# Patient Record
Sex: FEMALE | Race: WHITE | NOT HISPANIC OR LATINO | Employment: OTHER | URBAN - METROPOLITAN AREA
[De-identification: names, ages, dates, MRNs, and addresses within clinical notes are randomized per-mention and may not be internally consistent; named-entity substitution may affect disease eponyms.]

---

## 2023-01-31 PROBLEM — I20.89 CHRONIC STABLE ANGINA (CMS-HCC): Status: ACTIVE | Noted: 2023-01-31

## 2023-01-31 PROBLEM — K80.20 SYMPTOMATIC CHOLELITHIASIS: Status: ACTIVE | Noted: 2023-01-31

## 2023-01-31 PROBLEM — E04.9 GOITER: Status: ACTIVE | Noted: 2023-01-31

## 2023-01-31 PROBLEM — E78.5 HYPERLIPIDEMIA, UNSPECIFIED: Status: ACTIVE | Noted: 2023-01-31

## 2023-01-31 PROBLEM — R20.2 PARESTHESIA: Status: ACTIVE | Noted: 2023-01-31

## 2023-01-31 PROBLEM — J84.9 INTERSTITIAL LUNG DISORDERS (MULTI): Status: ACTIVE | Noted: 2023-01-31

## 2023-01-31 PROBLEM — R14.0 ABDOMINAL BLOATING: Status: ACTIVE | Noted: 2023-01-31

## 2023-01-31 PROBLEM — R27.0 ATAXIA: Status: ACTIVE | Noted: 2023-01-31

## 2023-01-31 PROBLEM — R10.9 ABDOMINAL PAIN: Status: ACTIVE | Noted: 2023-01-31

## 2023-01-31 PROBLEM — S01.01XA SCALP LACERATION: Status: ACTIVE | Noted: 2023-01-31

## 2023-01-31 PROBLEM — I25.10 CAD (CORONARY ARTERY DISEASE): Status: ACTIVE | Noted: 2023-01-31

## 2023-01-31 PROBLEM — R10.11 BILATERAL UPPER ABDOMINAL PAIN: Status: ACTIVE | Noted: 2023-01-31

## 2023-01-31 PROBLEM — R10.12 BILATERAL UPPER ABDOMINAL PAIN: Status: ACTIVE | Noted: 2023-01-31

## 2023-01-31 PROBLEM — E04.1 THYROID NODULE: Status: ACTIVE | Noted: 2023-01-31

## 2023-01-31 PROBLEM — K80.20 CHOLELITHIASIS: Status: ACTIVE | Noted: 2023-01-31

## 2023-01-31 PROBLEM — Z95.5 PRESENCE OF STENT IN CORONARY ARTERY: Status: ACTIVE | Noted: 2023-01-31

## 2023-01-31 PROBLEM — E55.9 VITAMIN D DEFICIENCY: Status: ACTIVE | Noted: 2023-01-31

## 2023-01-31 PROBLEM — R26.81 GAIT INSTABILITY: Status: ACTIVE | Noted: 2023-01-31

## 2023-01-31 PROBLEM — G45.9 TRANSIENT ISCHEMIC ATTACK: Status: ACTIVE | Noted: 2023-01-31

## 2023-01-31 RX ORDER — ACETAMINOPHEN 500 MG
1 TABLET ORAL DAILY
COMMUNITY
End: 2023-11-13 | Stop reason: WASHOUT

## 2023-01-31 RX ORDER — SIMVASTATIN 10 MG/1
1 TABLET, FILM COATED ORAL DAILY
COMMUNITY
Start: 2019-03-25 | End: 2023-09-12 | Stop reason: SDUPTHER

## 2023-01-31 RX ORDER — ASPIRIN 81 MG/1
1 TABLET ORAL DAILY
COMMUNITY
End: 2024-03-13

## 2023-01-31 RX ORDER — MULTIVITAMIN
1 TABLET ORAL DAILY
COMMUNITY
End: 2023-11-13 | Stop reason: WASHOUT

## 2023-01-31 RX ORDER — GINSENG 100 MG
CAPSULE ORAL
COMMUNITY
End: 2023-11-13 | Stop reason: WASHOUT

## 2023-01-31 RX ORDER — AMLODIPINE BESYLATE 5 MG/1
1 TABLET ORAL DAILY
COMMUNITY
Start: 2018-04-22 | End: 2024-04-19 | Stop reason: SDUPTHER

## 2023-01-31 RX ORDER — METOPROLOL TARTRATE 25 MG/1
1 TABLET, FILM COATED ORAL
COMMUNITY
Start: 2018-04-16 | End: 2024-04-19 | Stop reason: SDUPTHER

## 2023-03-14 ENCOUNTER — APPOINTMENT (OUTPATIENT)
Dept: PRIMARY CARE | Facility: CLINIC | Age: 88
End: 2023-03-14
Payer: MEDICARE

## 2023-04-21 PROBLEM — R39.11 URINARY HESITANCY: Status: ACTIVE | Noted: 2023-04-21

## 2023-04-21 PROBLEM — J31.0 ATROPHIC RHINITIS: Status: ACTIVE | Noted: 2023-04-21

## 2023-04-21 PROBLEM — H53.9 VISION CHANGES: Status: ACTIVE | Noted: 2023-04-21
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
## 2023-04-21 PROBLEM — E78.9 LIPID DISORDER: Status: ACTIVE | Noted: 2023-04-21

## 2023-04-21 PROBLEM — R04.0 EPISTAXIS: Status: ACTIVE | Noted: 2023-04-21

## 2023-04-21 PROBLEM — E78.5 HYPERLIPIDEMIA: Status: ACTIVE | Noted: 2023-04-21

## 2023-04-21 PROBLEM — R07.89 CHEST PAIN, MIDSTERNAL: Status: ACTIVE | Noted: 2023-04-21

## 2023-04-21 RX ORDER — NAPROXEN SODIUM 220 MG/1
1 TABLET, FILM COATED ORAL DAILY
COMMUNITY
End: 2023-04-24

## 2023-04-24 ENCOUNTER — OFFICE VISIT (OUTPATIENT)
Dept: PRIMARY CARE | Facility: CLINIC | Age: 88
End: 2023-04-24
Payer: MEDICARE

## 2023-04-24 VITALS
TEMPERATURE: 96.8 F | SYSTOLIC BLOOD PRESSURE: 118 MMHG | OXYGEN SATURATION: 97 % | WEIGHT: 112 LBS | BODY MASS INDEX: 22.62 KG/M2 | RESPIRATION RATE: 18 BRPM | DIASTOLIC BLOOD PRESSURE: 70 MMHG | HEART RATE: 70 BPM

## 2023-04-24 DIAGNOSIS — E78.2 MIXED HYPERLIPIDEMIA: ICD-10-CM

## 2023-04-24 DIAGNOSIS — I10 PRIMARY HYPERTENSION: ICD-10-CM

## 2023-04-24 DIAGNOSIS — I25.10 CORONARY ARTERY DISEASE WITHOUT ANGINA PECTORIS, UNSPECIFIED VESSEL OR LESION TYPE, UNSPECIFIED WHETHER NATIVE OR TRANSPLANTED HEART: Primary | ICD-10-CM

## 2023-04-24 DIAGNOSIS — R27.0 ATAXIA: ICD-10-CM

## 2023-04-24 PROCEDURE — 1036F TOBACCO NON-USER: CPT | Performed by: INTERNAL MEDICINE

## 2023-04-24 PROCEDURE — 3074F SYST BP LT 130 MM HG: CPT | Performed by: INTERNAL MEDICINE

## 2023-04-24 PROCEDURE — 99213 OFFICE O/P EST LOW 20 MIN: CPT | Performed by: INTERNAL MEDICINE

## 2023-04-24 PROCEDURE — 1157F ADVNC CARE PLAN IN RCRD: CPT | Performed by: INTERNAL MEDICINE

## 2023-04-24 PROCEDURE — 3078F DIAST BP <80 MM HG: CPT | Performed by: INTERNAL MEDICINE

## 2023-04-24 PROCEDURE — 1159F MED LIST DOCD IN RCRD: CPT | Performed by: INTERNAL MEDICINE

## 2023-04-24 ASSESSMENT — PATIENT HEALTH QUESTIONNAIRE - PHQ9
SUM OF ALL RESPONSES TO PHQ9 QUESTIONS 1 AND 2: 0
2. FEELING DOWN, DEPRESSED OR HOPELESS: NOT AT ALL
1. LITTLE INTEREST OR PLEASURE IN DOING THINGS: NOT AT ALL

## 2023-04-24 ASSESSMENT — PAIN SCALES - GENERAL: PAINLEVEL: 0-NO PAIN
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
## 2023-04-24 NOTE — ASSESSMENT & PLAN NOTE
Excellent control no dizziness or lightheadedness no change for now continue to follow renal panel

## 2023-07-28 ENCOUNTER — OFFICE VISIT (OUTPATIENT)
Dept: PRIMARY CARE | Facility: CLINIC | Age: 88
End: 2023-07-28
Payer: MEDICARE

## 2023-07-28 VITALS
HEART RATE: 76 BPM | OXYGEN SATURATION: 96 % | SYSTOLIC BLOOD PRESSURE: 134 MMHG | DIASTOLIC BLOOD PRESSURE: 70 MMHG | HEIGHT: 59 IN | TEMPERATURE: 97.7 F | WEIGHT: 112 LBS | BODY MASS INDEX: 22.58 KG/M2

## 2023-07-28 DIAGNOSIS — R53.83 OTHER FATIGUE: ICD-10-CM

## 2023-07-28 DIAGNOSIS — I10 PRIMARY HYPERTENSION: ICD-10-CM

## 2023-07-28 DIAGNOSIS — I25.10 CORONARY ARTERY DISEASE WITHOUT ANGINA PECTORIS, UNSPECIFIED VESSEL OR LESION TYPE, UNSPECIFIED WHETHER NATIVE OR TRANSPLANTED HEART: Primary | ICD-10-CM

## 2023-07-28 DIAGNOSIS — R73.9 HYPERGLYCEMIA: ICD-10-CM

## 2023-07-28 DIAGNOSIS — E55.9 VITAMIN D DEFICIENCY: ICD-10-CM

## 2023-07-28 LAB
ALANINE AMINOTRANSFERASE (SGPT) (U/L) IN SER/PLAS: 11 U/L (ref 7–45)
ALBUMIN (G/DL) IN SER/PLAS: 4.1 G/DL (ref 3.4–5)
ALKALINE PHOSPHATASE (U/L) IN SER/PLAS: 74 U/L (ref 33–136)
ANION GAP IN SER/PLAS: 12 MMOL/L (ref 10–20)
ASPARTATE AMINOTRANSFERASE (SGOT) (U/L) IN SER/PLAS: 19 U/L (ref 9–39)
BILIRUBIN TOTAL (MG/DL) IN SER/PLAS: 0.7 MG/DL (ref 0–1.2)
CALCIDIOL (25 OH VITAMIN D3) (NG/ML) IN SER/PLAS: 67 NG/ML
CALCIUM (MG/DL) IN SER/PLAS: 9.4 MG/DL (ref 8.6–10.6)
CARBON DIOXIDE, TOTAL (MMOL/L) IN SER/PLAS: 28 MMOL/L (ref 21–32)
CHLORIDE (MMOL/L) IN SER/PLAS: 103 MMOL/L (ref 98–107)
CREATININE (MG/DL) IN SER/PLAS: 0.83 MG/DL (ref 0.5–1.05)
ERYTHROCYTE DISTRIBUTION WIDTH (RATIO) BY AUTOMATED COUNT: 15.6 % (ref 11.5–14.5)
ERYTHROCYTE MEAN CORPUSCULAR HEMOGLOBIN CONCENTRATION (G/DL) BY AUTOMATED: 31.7 G/DL (ref 32–36)
ERYTHROCYTE MEAN CORPUSCULAR VOLUME (FL) BY AUTOMATED COUNT: 97 FL (ref 80–100)
ERYTHROCYTES (10*6/UL) IN BLOOD BY AUTOMATED COUNT: 4.25 X10E12/L (ref 4–5.2)
GFR FEMALE: 65 ML/MIN/1.73M2
GLUCOSE (MG/DL) IN SER/PLAS: 90 MG/DL (ref 74–99)
HEMATOCRIT (%) IN BLOOD BY AUTOMATED COUNT: 41.3 % (ref 36–46)
HEMOGLOBIN (G/DL) IN BLOOD: 13.1 G/DL (ref 12–16)
LEUKOCYTES (10*3/UL) IN BLOOD BY AUTOMATED COUNT: 5.6 X10E9/L (ref 4.4–11.3)
NRBC (PER 100 WBCS) BY AUTOMATED COUNT: 0 /100 WBC (ref 0–0)
PLATELETS (10*3/UL) IN BLOOD AUTOMATED COUNT: 168 X10E9/L (ref 150–450)
POTASSIUM (MMOL/L) IN SER/PLAS: 4.6 MMOL/L (ref 3.5–5.3)
PROTEIN TOTAL: 7.3 G/DL (ref 6.4–8.2)
SODIUM (MMOL/L) IN SER/PLAS: 138 MMOL/L (ref 136–145)
THYROTROPIN (MIU/L) IN SER/PLAS BY DETECTION LIMIT <= 0.05 MIU/L: 3.2 MIU/L (ref 0.44–3.98)
UREA NITROGEN (MG/DL) IN SER/PLAS: 16 MG/DL (ref 6–23)

## 2023-07-28 PROCEDURE — 1036F TOBACCO NON-USER: CPT | Performed by: FAMILY MEDICINE

## 2023-07-28 PROCEDURE — 82306 VITAMIN D 25 HYDROXY: CPT

## 2023-07-28 PROCEDURE — 83036 HEMOGLOBIN GLYCOSYLATED A1C: CPT

## 2023-07-28 PROCEDURE — 99214 OFFICE O/P EST MOD 30 MIN: CPT | Performed by: FAMILY MEDICINE

## 2023-07-28 PROCEDURE — 3078F DIAST BP <80 MM HG: CPT | Performed by: FAMILY MEDICINE

## 2023-07-28 PROCEDURE — 1157F ADVNC CARE PLAN IN RCRD: CPT | Performed by: FAMILY MEDICINE

## 2023-07-28 PROCEDURE — 80053 COMPREHEN METABOLIC PANEL: CPT

## 2023-07-28 PROCEDURE — 3075F SYST BP GE 130 - 139MM HG: CPT | Performed by: FAMILY MEDICINE

## 2023-07-28 PROCEDURE — 85027 COMPLETE CBC AUTOMATED: CPT

## 2023-07-28 PROCEDURE — 1126F AMNT PAIN NOTED NONE PRSNT: CPT | Performed by: FAMILY MEDICINE

## 2023-07-28 PROCEDURE — 1159F MED LIST DOCD IN RCRD: CPT | Performed by: FAMILY MEDICINE

## 2023-07-28 PROCEDURE — 84443 ASSAY THYROID STIM HORMONE: CPT

## 2023-07-28 ASSESSMENT — PAIN SCALES - GENERAL: PAINLEVEL: 0-NO PAIN

## 2023-07-28 NOTE — PROGRESS NOTES
"Subjective   Patient ID: Lashonda Bran is a 89 y.o. female who presents for New Patient Visit (Establish care with pcp, pt is not fasting. ).    HPI patient here to establish    . Lives alone. 2 children. Grand and great kids.    Keeps active, admits to be slowing. Driving.  Just saw ophthalmology. All stable.  Tires easily. Takes Vit. D.  Still feels she needs to sleep a lot.  She does take naps.  Review of Systems    Objective   /70 (BP Location: Left arm, Patient Position: Sitting, BP Cuff Size: Adult)   Pulse 76   Temp 36.5 °C (97.7 °F) (Temporal)   Ht 1.499 m (4' 11\")   Wt 50.8 kg (112 lb)   SpO2 96%   BMI 22.62 kg/m²     Physical Exam  Alert, pleasant and in no acute distress.  Heart: Regular rate and rhythm without murmur  Lungs: Clear to auscultation  Lower extremities: No edema  Assessment/Plan   Problem List Items Addressed This Visit          Cardiac and Vasculature    CAD (coronary artery disease) - Primary    Relevant Orders    CBC    TSH with reflex to Free T4 if abnormal    Vitamin D 25-Hydroxy,Total    Comprehensive Metabolic Panel    Hemoglobin A1C    Primary hypertension    Relevant Orders    CBC    TSH with reflex to Free T4 if abnormal    Vitamin D 25-Hydroxy,Total    Comprehensive Metabolic Panel    Hemoglobin A1C       Endocrine/Metabolic    Vitamin D deficiency    Relevant Orders    CBC    TSH with reflex to Free T4 if abnormal    Vitamin D 25-Hydroxy,Total    Comprehensive Metabolic Panel    Hemoglobin A1C     Other Visit Diagnoses       Other fatigue        Relevant Orders    CBC    TSH with reflex to Free T4 if abnormal    Vitamin D 25-Hydroxy,Total    Comprehensive Metabolic Panel    Hemoglobin A1C    Hyperglycemia        Relevant Orders    Hemoglobin A1C        Patient here to establish.  She is a very healthy 89-year-old.  She has multiple medical conditions including coronary disease, hypertension, vitamin D deficiency and hyperglycemia.  With complaint of " progressive fatigue, we will check a CBC, CMP, TSH and a vitamin D.  We will also recheck an A1c has her last 1 was elevated at 6.3 although all other sugar readings look good.  We will contact patient in 3 to 4 days.  She is to call or follow-up if there is any progressive worsening or new symptoms.  Otherwise, we will plan follow-up in 6 months

## 2023-07-29 LAB
ESTIMATED AVERAGE GLUCOSE FOR HBA1C: 120 MG/DL
HEMOGLOBIN A1C/HEMOGLOBIN TOTAL IN BLOOD: 5.8 %

## 2023-09-12 DIAGNOSIS — E78.5 DYSLIPIDEMIA: ICD-10-CM

## 2023-09-13 DIAGNOSIS — E78.2 MIXED HYPERLIPIDEMIA: Primary | ICD-10-CM

## 2023-09-13 RX ORDER — SIMVASTATIN 10 MG/1
10 TABLET, FILM COATED ORAL NIGHTLY
Qty: 90 TABLET | Refills: 3 | Status: SHIPPED
Start: 2023-09-13 | End: 2023-11-13 | Stop reason: WASHOUT

## 2023-09-13 RX ORDER — SIMVASTATIN 10 MG/1
10 TABLET, FILM COATED ORAL NIGHTLY
Qty: 90 TABLET | Refills: 3 | Status: SHIPPED | OUTPATIENT
Start: 2023-09-13

## 2023-10-24 ENCOUNTER — APPOINTMENT (OUTPATIENT)
Dept: PRIMARY CARE | Facility: CLINIC | Age: 88
End: 2023-10-24
Payer: MEDICARE

## 2023-11-13 PROBLEM — E78.5 HYPERLIPIDEMIA, UNSPECIFIED: Status: RESOLVED | Noted: 2023-01-31 | Resolved: 2023-11-13

## 2023-11-13 PROBLEM — E78.9 LIPID DISORDER: Status: RESOLVED | Noted: 2023-04-21 | Resolved: 2023-11-13

## 2023-11-13 PROBLEM — I25.10 ATHEROSCLEROTIC HEART DISEASE OF NATIVE CORONARY ARTERY WITHOUT ANGINA PECTORIS: Status: RESOLVED | Noted: 2017-05-10 | Resolved: 2023-11-13

## 2023-11-13 PROBLEM — I25.10 CORONARY ARTERIOSCLEROSIS: Status: RESOLVED | Noted: 2017-08-16 | Resolved: 2023-11-13

## 2023-11-13 PROBLEM — I10 PRIMARY HYPERTENSION: Status: RESOLVED | Noted: 2023-04-24 | Resolved: 2023-11-13

## 2023-11-15 ENCOUNTER — OFFICE VISIT (OUTPATIENT)
Dept: CARDIOLOGY | Facility: CLINIC | Age: 88
End: 2023-11-15
Payer: MEDICARE

## 2023-11-15 VITALS
DIASTOLIC BLOOD PRESSURE: 76 MMHG | WEIGHT: 111 LBS | HEIGHT: 59 IN | SYSTOLIC BLOOD PRESSURE: 132 MMHG | HEART RATE: 80 BPM | OXYGEN SATURATION: 96 % | BODY MASS INDEX: 22.38 KG/M2

## 2023-11-15 DIAGNOSIS — I10 ESSENTIAL HYPERTENSION: ICD-10-CM

## 2023-11-15 DIAGNOSIS — R07.89 CHEST PAIN, MIDSTERNAL: ICD-10-CM

## 2023-11-15 DIAGNOSIS — I20.89 CHRONIC STABLE ANGINA (CMS-HCC): ICD-10-CM

## 2023-11-15 DIAGNOSIS — Z95.5 PRESENCE OF STENT IN CORONARY ARTERY: ICD-10-CM

## 2023-11-15 DIAGNOSIS — E78.2 MIXED HYPERLIPIDEMIA: ICD-10-CM

## 2023-11-15 DIAGNOSIS — I25.10 CORONARY ARTERY DISEASE WITHOUT ANGINA PECTORIS, UNSPECIFIED VESSEL OR LESION TYPE, UNSPECIFIED WHETHER NATIVE OR TRANSPLANTED HEART: Primary | ICD-10-CM

## 2023-11-15 PROCEDURE — 1036F TOBACCO NON-USER: CPT | Performed by: INTERNAL MEDICINE

## 2023-11-15 PROCEDURE — 3075F SYST BP GE 130 - 139MM HG: CPT | Performed by: INTERNAL MEDICINE

## 2023-11-15 PROCEDURE — 3078F DIAST BP <80 MM HG: CPT | Performed by: INTERNAL MEDICINE

## 2023-11-15 PROCEDURE — 1126F AMNT PAIN NOTED NONE PRSNT: CPT | Performed by: INTERNAL MEDICINE

## 2023-11-15 PROCEDURE — 99213 OFFICE O/P EST LOW 20 MIN: CPT | Performed by: INTERNAL MEDICINE

## 2023-11-15 PROCEDURE — 1159F MED LIST DOCD IN RCRD: CPT | Performed by: INTERNAL MEDICINE

## 2023-11-15 ASSESSMENT — ENCOUNTER SYMPTOMS: DYSPNEA ON EXERTION: 1

## 2023-11-15 ASSESSMENT — PAIN SCALES - GENERAL: PAINLEVEL: 0-NO PAIN

## 2023-11-15 NOTE — PROGRESS NOTES
Subjective   Lashonda Bran is a 89 y.o. female.    Chief Complaint:  Follow-up coronary artery disease.    HPI    Since her last visit she has had no further episodes of angina.  She remains very independent.  She lives on her own.  She does not get any pressure or heaviness in the chest.  She has been having some anginal symptoms.  On her last visit we performed a Lexiscan thallium study which showed normal left ventricular function and no ischemia.    She presented in 2017 with an acute myocardial infarction. She presented in 2017 with an acute myocardial infarction. At that time she had discomfort which began in the lower chest area and spread up through the chest and down into the arms with shortness of breath. She presented to the hospital where she was taken urgently to the cardiac catheterization laboratory for intervention. At that time she had a stent placed.     She denies a history of hypertension. Has a history of hyperlipidemia. No family history of premature coronary artery disease. Is on statin therapy. She did have a transient ischemic attack in 2016. At that time her work-up was negative.     Past medical history: Significant for interstitial lung disease. She has had a history of cholecystitis treated medically.     Past surgical history significant for appendectomy, hysterectomy, carpal tunnel, lumbar spine surgery, and some minor surgical procedures.     The patient is . Has two children and grandchildren and great-grandchildren in the area.      Allergies  Medication    · Aspirin TABS   Recorded By: Liliana Leal; 7/1/2016 10:52:49 AM   · codeine   Recorded By: Clifford Mccall; 5/29/2018 12:51:26 PM   · Darvocet A500   Recorded By: Liliana Leal; 7/1/2016 10:52:50 AM   · Penicillins   Recorded By: Liliana Leal; 7/1/2016 10:52:49 AM   · Percocet TABS   Recorded By: Liliana Leal; 7/1/2016 10:52:49 AM   · sulfa   Recorded By: Liliana Leal; 7/1/2016 10:52:49 AM   ·  "Vicodin TABS   Recorded By: Liliana Leal; 7/1/2016 10:52:50 AM     Family History  Mother    · Family history of cerebrovascular accident (CVA) (V17.1) (Z82.3)  Father    · Family history of diabetes mellitus (V18.0) (Z83.3)   · Family history of Heart problem  Brother    · Family history of malignant neoplasm (V16.9) (Z80.9)     Social History  Problems    · Lives alone with help available (V60.3) (Z60.2)   · Never smoker   · No alcohol use   ·  (V61.07) (Z63.4)    Review of Systems   Constitutional: Positive for malaise/fatigue.   Cardiovascular:  Positive for dyspnea on exertion.   Musculoskeletal:  Positive for arthritis.       Visit Vitals  /76 (BP Location: Left arm, Patient Position: Sitting, BP Cuff Size: Adult)   Pulse 80   Ht 1.499 m (4' 11\")   Wt 50.3 kg (111 lb)   SpO2 96%   BMI 22.42 kg/m²   OB Status Hysterectomy   Smoking Status Never   BSA 1.45 m²        Objective     Constitutional:       Appearance: Not in distress.   Neck:      Vascular: JVD normal.   Pulmonary:      Breath sounds: Normal breath sounds.   Cardiovascular:      Normal rate. Regular rhythm. Normal S1. Normal S2.       Murmurs: There is a grade 1/6 systolic murmur.      No gallop.    Pulses:     Intact distal pulses.   Edema:     Peripheral edema absent.   Abdominal:      General: There is no distension.      Palpations: Abdomen is soft.   Neurological:      Mental Status: Alert.         Lab Review:   Lab Results   Component Value Date     07/28/2023    K 4.6 07/28/2023     07/28/2023    CO2 28 07/28/2023    BUN 16 07/28/2023    CREATININE 0.83 07/28/2023    GLUCOSE 90 07/28/2023    CALCIUM 9.4 07/28/2023     Lab Results   Component Value Date    CHOL 165 02/08/2023    TRIG 114 02/08/2023    HDL 70.2 02/08/2023       Assessment:    1.  Coronary artery disease.  No anginal symptoms.  Continue medical management.    2.  Hypertension.  Blood pressures are excellent for her age.    3.  Hyperlipidemia.  " Cholesterol 165, HDL 70, LDL 72.

## 2023-12-27 ENCOUNTER — TELEPHONE (OUTPATIENT)
Dept: PRIMARY CARE | Facility: CLINIC | Age: 88
End: 2023-12-27
Payer: MEDICARE

## 2023-12-27 DIAGNOSIS — N30.00 ACUTE CYSTITIS WITHOUT HEMATURIA: Primary | ICD-10-CM

## 2023-12-27 RX ORDER — NITROFURANTOIN 25; 75 MG/1; MG/1
100 CAPSULE ORAL 2 TIMES DAILY
Qty: 14 CAPSULE | Refills: 0 | Status: SHIPPED | OUTPATIENT
Start: 2023-12-27 | End: 2024-01-03

## 2023-12-27 NOTE — TELEPHONE ENCOUNTER
Patient called and she is sure she has UTI and was wondering if you could put a order in for her to get a Urine test done at the Lab.

## 2023-12-29 ENCOUNTER — LAB (OUTPATIENT)
Dept: LAB | Facility: LAB | Age: 88
End: 2023-12-29
Payer: MEDICARE

## 2023-12-29 DIAGNOSIS — N30.00 ACUTE CYSTITIS WITHOUT HEMATURIA: ICD-10-CM

## 2023-12-29 LAB
APPEARANCE UR: CLEAR
BILIRUB UR STRIP.AUTO-MCNC: NEGATIVE MG/DL
COLOR UR: YELLOW
GLUCOSE UR STRIP.AUTO-MCNC: NEGATIVE MG/DL
KETONES UR STRIP.AUTO-MCNC: NEGATIVE MG/DL
LEUKOCYTE ESTERASE UR QL STRIP.AUTO: NEGATIVE
NITRITE UR QL STRIP.AUTO: NEGATIVE
PH UR STRIP.AUTO: 6 [PH]
PROT UR STRIP.AUTO-MCNC: NEGATIVE MG/DL
RBC # UR STRIP.AUTO: NEGATIVE /UL
SP GR UR STRIP.AUTO: 1.01
UROBILINOGEN UR STRIP.AUTO-MCNC: <2 MG/DL

## 2023-12-29 PROCEDURE — 81003 URINALYSIS AUTO W/O SCOPE: CPT

## 2023-12-29 NOTE — TELEPHONE ENCOUNTER
Patient wasn't able to get urine test strip from the pharmacy because they were out of stock. The pharmacist said it was okay to take the antibiotics but after taking it the patient experience side effects .  Side effects: hoariness, confusion,    They want to know if you can order a UA to be done at the lab

## 2023-12-30 LAB — HOLD SPECIMEN: NORMAL

## 2024-01-03 ENCOUNTER — APPOINTMENT (OUTPATIENT)
Dept: CARDIOLOGY | Facility: HOSPITAL | Age: 89
End: 2024-01-03
Payer: MEDICARE

## 2024-01-03 ENCOUNTER — HOSPITAL ENCOUNTER (EMERGENCY)
Facility: HOSPITAL | Age: 89
Discharge: HOME | End: 2024-01-03
Attending: INTERNAL MEDICINE
Payer: MEDICARE

## 2024-01-03 ENCOUNTER — APPOINTMENT (OUTPATIENT)
Dept: RADIOLOGY | Facility: HOSPITAL | Age: 89
End: 2024-01-03
Payer: MEDICARE

## 2024-01-03 VITALS
TEMPERATURE: 97.5 F | OXYGEN SATURATION: 96 % | BODY MASS INDEX: 22.18 KG/M2 | HEIGHT: 59 IN | HEART RATE: 86 BPM | SYSTOLIC BLOOD PRESSURE: 154 MMHG | WEIGHT: 110 LBS | DIASTOLIC BLOOD PRESSURE: 81 MMHG | RESPIRATION RATE: 16 BRPM

## 2024-01-03 DIAGNOSIS — E86.0 DEHYDRATION: Primary | ICD-10-CM

## 2024-01-03 DIAGNOSIS — R53.1 GENERALIZED WEAKNESS: ICD-10-CM

## 2024-01-03 DIAGNOSIS — J06.9 UPPER RESPIRATORY TRACT INFECTION, UNSPECIFIED TYPE: ICD-10-CM

## 2024-01-03 LAB
ALBUMIN SERPL BCP-MCNC: 4.7 G/DL (ref 3.4–5)
ALP SERPL-CCNC: 79 U/L (ref 33–136)
ALT SERPL W P-5'-P-CCNC: 12 U/L (ref 7–45)
ANION GAP SERPL CALC-SCNC: 12 MMOL/L (ref 10–20)
APPEARANCE UR: CLEAR
AST SERPL W P-5'-P-CCNC: 17 U/L (ref 9–39)
BASOPHILS # BLD AUTO: 0.03 X10*3/UL (ref 0–0.1)
BASOPHILS NFR BLD AUTO: 0.4 %
BILIRUB SERPL-MCNC: 0.7 MG/DL (ref 0–1.2)
BILIRUB UR STRIP.AUTO-MCNC: NEGATIVE MG/DL
BNP SERPL-MCNC: 62 PG/ML (ref 0–99)
BUN SERPL-MCNC: 13 MG/DL (ref 6–23)
CALCIUM SERPL-MCNC: 9.9 MG/DL (ref 8.6–10.3)
CARDIAC TROPONIN I PNL SERPL HS: 6 NG/L (ref 0–13)
CARDIAC TROPONIN I PNL SERPL HS: 6 NG/L (ref 0–13)
CHLORIDE SERPL-SCNC: 98 MMOL/L (ref 98–107)
CO2 SERPL-SCNC: 27 MMOL/L (ref 21–32)
COLOR UR: YELLOW
CREAT SERPL-MCNC: 0.76 MG/DL (ref 0.5–1.05)
EOSINOPHIL # BLD AUTO: 0.03 X10*3/UL (ref 0–0.4)
EOSINOPHIL NFR BLD AUTO: 0.4 %
ERYTHROCYTE [DISTWIDTH] IN BLOOD BY AUTOMATED COUNT: 14.6 % (ref 11.5–14.5)
FLUAV RNA RESP QL NAA+PROBE: NOT DETECTED
FLUBV RNA RESP QL NAA+PROBE: NOT DETECTED
GFR SERPL CREATININE-BSD FRML MDRD: 72 ML/MIN/1.73M*2
GLUCOSE SERPL-MCNC: 98 MG/DL (ref 74–99)
GLUCOSE UR STRIP.AUTO-MCNC: NEGATIVE MG/DL
HCT VFR BLD AUTO: 44.6 % (ref 36–46)
HGB BLD-MCNC: 15.3 G/DL (ref 12–16)
IMM GRANULOCYTES # BLD AUTO: 0.02 X10*3/UL (ref 0–0.5)
IMM GRANULOCYTES NFR BLD AUTO: 0.3 % (ref 0–0.9)
KETONES UR STRIP.AUTO-MCNC: ABNORMAL MG/DL
LACTATE SERPL-SCNC: 0.9 MMOL/L (ref 0.4–2)
LEUKOCYTE ESTERASE UR QL STRIP.AUTO: NEGATIVE
LIPASE SERPL-CCNC: 16 U/L (ref 9–82)
LYMPHOCYTES # BLD AUTO: 1.21 X10*3/UL (ref 0.8–3)
LYMPHOCYTES NFR BLD AUTO: 18.1 %
MAGNESIUM SERPL-MCNC: 2.09 MG/DL (ref 1.6–2.4)
MCH RBC QN AUTO: 32 PG (ref 26–34)
MCHC RBC AUTO-ENTMCNC: 34.3 G/DL (ref 32–36)
MCV RBC AUTO: 93 FL (ref 80–100)
MONOCYTES # BLD AUTO: 0.37 X10*3/UL (ref 0.05–0.8)
MONOCYTES NFR BLD AUTO: 5.5 %
NEUTROPHILS # BLD AUTO: 5.02 X10*3/UL (ref 1.6–5.5)
NEUTROPHILS NFR BLD AUTO: 75.3 %
NITRITE UR QL STRIP.AUTO: NEGATIVE
NRBC BLD-RTO: 0 /100 WBCS (ref 0–0)
PH UR STRIP.AUTO: 5 [PH]
PHOSPHATE SERPL-MCNC: 3.8 MG/DL (ref 2.5–4.9)
PLATELET # BLD AUTO: 211 X10*3/UL (ref 150–450)
POTASSIUM SERPL-SCNC: 3.9 MMOL/L (ref 3.5–5.3)
PROT SERPL-MCNC: 8.6 G/DL (ref 6.4–8.2)
PROT UR STRIP.AUTO-MCNC: NEGATIVE MG/DL
RBC # BLD AUTO: 4.78 X10*6/UL (ref 4–5.2)
RBC # UR STRIP.AUTO: NEGATIVE /UL
RSV RNA RESP QL NAA+PROBE: NOT DETECTED
SARS-COV-2 RNA RESP QL NAA+PROBE: NOT DETECTED
SODIUM SERPL-SCNC: 133 MMOL/L (ref 136–145)
SP GR UR STRIP.AUTO: 1.01
UROBILINOGEN UR STRIP.AUTO-MCNC: <2 MG/DL
WBC # BLD AUTO: 6.7 X10*3/UL (ref 4.4–11.3)

## 2024-01-03 PROCEDURE — 83880 ASSAY OF NATRIURETIC PEPTIDE: CPT | Performed by: PHYSICIAN ASSISTANT

## 2024-01-03 PROCEDURE — 93005 ELECTROCARDIOGRAM TRACING: CPT

## 2024-01-03 PROCEDURE — 80069 RENAL FUNCTION PANEL: CPT | Performed by: PHYSICIAN ASSISTANT

## 2024-01-03 PROCEDURE — 96361 HYDRATE IV INFUSION ADD-ON: CPT

## 2024-01-03 PROCEDURE — 81003 URINALYSIS AUTO W/O SCOPE: CPT | Performed by: INTERNAL MEDICINE

## 2024-01-03 PROCEDURE — 83735 ASSAY OF MAGNESIUM: CPT | Performed by: PHYSICIAN ASSISTANT

## 2024-01-03 PROCEDURE — 83605 ASSAY OF LACTIC ACID: CPT | Performed by: PHYSICIAN ASSISTANT

## 2024-01-03 PROCEDURE — 84484 ASSAY OF TROPONIN QUANT: CPT | Performed by: PHYSICIAN ASSISTANT

## 2024-01-03 PROCEDURE — 36415 COLL VENOUS BLD VENIPUNCTURE: CPT | Performed by: PHYSICIAN ASSISTANT

## 2024-01-03 PROCEDURE — 87637 SARSCOV2&INF A&B&RSV AMP PRB: CPT | Performed by: PHYSICIAN ASSISTANT

## 2024-01-03 PROCEDURE — 85025 COMPLETE CBC W/AUTO DIFF WBC: CPT | Performed by: PHYSICIAN ASSISTANT

## 2024-01-03 PROCEDURE — 84100 ASSAY OF PHOSPHORUS: CPT | Performed by: PHYSICIAN ASSISTANT

## 2024-01-03 PROCEDURE — 96360 HYDRATION IV INFUSION INIT: CPT

## 2024-01-03 PROCEDURE — 71046 X-RAY EXAM CHEST 2 VIEWS: CPT

## 2024-01-03 PROCEDURE — 83690 ASSAY OF LIPASE: CPT | Performed by: PHYSICIAN ASSISTANT

## 2024-01-03 PROCEDURE — 2500000004 HC RX 250 GENERAL PHARMACY W/ HCPCS (ALT 636 FOR OP/ED): Performed by: INTERNAL MEDICINE

## 2024-01-03 PROCEDURE — 71046 X-RAY EXAM CHEST 2 VIEWS: CPT | Performed by: RADIOLOGY

## 2024-01-03 PROCEDURE — 99284 EMERGENCY DEPT VISIT MOD MDM: CPT | Performed by: INTERNAL MEDICINE

## 2024-01-03 RX ORDER — AZITHROMYCIN 250 MG/1
250 TABLET, FILM COATED ORAL DAILY
Qty: 6 TABLET | Refills: 0 | Status: SHIPPED | OUTPATIENT
Start: 2024-01-04 | End: 2024-01-09

## 2024-01-03 RX ORDER — ACETAMINOPHEN 325 MG/1
975 TABLET ORAL ONCE
Status: COMPLETED | OUTPATIENT
Start: 2024-01-03 | End: 2024-01-03

## 2024-01-03 RX ADMIN — ACETAMINOPHEN 975 MG: 325 TABLET ORAL at 21:20

## 2024-01-03 RX ADMIN — SODIUM CHLORIDE, POTASSIUM CHLORIDE, SODIUM LACTATE AND CALCIUM CHLORIDE 500 ML: 600; 310; 30; 20 INJECTION, SOLUTION INTRAVENOUS at 21:22

## 2024-01-03 ASSESSMENT — COLUMBIA-SUICIDE SEVERITY RATING SCALE - C-SSRS
2. HAVE YOU ACTUALLY HAD ANY THOUGHTS OF KILLING YOURSELF?: NO
1. IN THE PAST MONTH, HAVE YOU WISHED YOU WERE DEAD OR WISHED YOU COULD GO TO SLEEP AND NOT WAKE UP?: NO
6. HAVE YOU EVER DONE ANYTHING, STARTED TO DO ANYTHING, OR PREPARED TO DO ANYTHING TO END YOUR LIFE?: NO

## 2024-01-03 ASSESSMENT — LIFESTYLE VARIABLES
HAVE YOU EVER FELT YOU SHOULD CUT DOWN ON YOUR DRINKING: NO
EVER FELT BAD OR GUILTY ABOUT YOUR DRINKING: NO
HAVE PEOPLE ANNOYED YOU BY CRITICIZING YOUR DRINKING: NO
EVER HAD A DRINK FIRST THING IN THE MORNING TO STEADY YOUR NERVES TO GET RID OF A HANGOVER: NO
REASON UNABLE TO ASSESS: NO

## 2024-01-03 ASSESSMENT — PAIN DESCRIPTION - PAIN TYPE: TYPE: ACUTE PAIN

## 2024-01-03 ASSESSMENT — PAIN DESCRIPTION - DESCRIPTORS: DESCRIPTORS: BURNING

## 2024-01-03 ASSESSMENT — PAIN DESCRIPTION - LOCATION: LOCATION: THROAT

## 2024-01-03 ASSESSMENT — PAIN - FUNCTIONAL ASSESSMENT: PAIN_FUNCTIONAL_ASSESSMENT: 0-10

## 2024-01-03 ASSESSMENT — PAIN SCALES - GENERAL: PAINLEVEL_OUTOF10: 0 - NO PAIN

## 2024-01-03 NOTE — ED TRIAGE NOTES
TRIAGE NOTE   I saw the patient as the Clinician in Triage and performed a brief history and physical exam, established acuity, and ordered appropriate tests to develop basic plan of care. Patient will be seen by an [facility], resident and/or physician who will independently evaluate the patient. Please see subsequent provider notes for further details and disposition.     Brief HPI: In brief, Lashonda Bran is a 89 y.o. female that presents for generalized weakness.  She thought she had a UTI and was called in Macrobid but she only took 2 doses.  She states that she had a urine test and it was clean.  She just feels out of it.  Just feels very tired and weak.  No chest pain shortness of breath abdominal pain nausea vomiting.  She states her throat hurts her but otherwise unremarkable.  No cough cold or fever.  She states she was really warm and sweating to but no fevers.  Her primary care doctor is Dr. Juan Jose Arreaga.       Focused PE:  - Constitutional: Alert and oriented x3.  In no acute distress, well-nourished and hydrated.  Cooperative.  - Skin: Pink, warm and dry.    -Neurological: Neurologically intact.    - Musculoskeletal: KATLIN x4, Normal gait. MSP´s intact.    - Cardiac: Regular rate rhythm.  - Pulmonary: Lungs clear bilaterally. No rales, rhonchi or wheezing.  No stridor or accessory muscle use.  - Abdomen: Abdomen soft and nontender with bowel sounds.  No rebound or guarding.  No CVA tenderness.    Note, physical exam may be limited by patient positioning sitting up in a chair.    Plan/MDM: I examined the patient in triage due to high volumes in the ER.  Labs, testing , and initial imaging based upon reported CC and focused exam      - For the remainder of the patient's workup and ED course, please see the main ED provider note. We discussed need for diagnostic testing including laboratory studies and imaging. We also discussed that they may be asked to wait in the waiting room while these tests are  pending. They understand that if they choose to leave without having the testing completed or resulted that we cannot rule out acute life threatening illnesses and the risks involved could lead to worsening condition, permanent disability or even death

## 2024-01-03 NOTE — ED TRIAGE NOTES
PT. STATES LAST WEDNESDAY THOUGHT HAD UTI, STARTED MACROBID. PT. STATES HAD A REACTION TO MED SO STOPPED TAKING IT. PT. STATES TOOK URINE TEST ON FRIDAY WHICH WAS NEGATIVE. PT. STATES HAVING NIGHT SWEATS, SORE THROAT, AND JUST NOT FEELING WELL. DENIES FEVERS.

## 2024-01-04 ENCOUNTER — TELEPHONE (OUTPATIENT)
Dept: PRIMARY CARE | Facility: CLINIC | Age: 89
End: 2024-01-04
Payer: MEDICARE

## 2024-01-04 LAB
ATRIAL RATE: 68 BPM
HOLD SPECIMEN: NORMAL
P AXIS: 79 DEGREES
P OFFSET: 180 MS
P ONSET: 153 MS
PR INTERVAL: 140 MS
Q ONSET: 223 MS
QRS COUNT: 11 BEATS
QRS DURATION: 64 MS
QT INTERVAL: 406 MS
QTC CALCULATION(BAZETT): 431 MS
QTC FREDERICIA: 423 MS
R AXIS: -9 DEGREES
T AXIS: 19 DEGREES
T OFFSET: 426 MS
VENTRICULAR RATE: 68 BPM

## 2024-01-04 NOTE — ED PROVIDER NOTES
HPI   Chief Complaint   Patient presents with    Weakness, Gen     PT. STATES LAST WEDNESDAY THOUGHT HAD UTI, STARTED MACROBID. PT. STATES HAD A REACTION TO MED SO STOPPED TAKING IT. PT. STATES TOOK URINE TEST ON FRIDAY WHICH WAS NEGATIVE. PT. STATES HAVING NIGHT SWEATS, SORE THROAT, AND JUST NOT FEELING WELL. DENIES FEVERS.         Patient presenting for evaluation of generalized weakness.  Patient notes generalized malaise.  Patient states symptoms started for the last week.  Patient believes she had a urinary tract infection 1 week ago.  Patient states she was prescribed Macrobid by her primary care physician.  Patient took 2 doses of medication but she stated that it did not agree with her and that she was told her urinalysis was negative thus she discontinued the medication.  Patient noted a small amount of sore throat.  Patient notes hoarseness and chronic dry cough.  Patient states she is having difficulty walking due to generalized weakness.      History provided by:  Patient                      [place] Coma Scale Score: 15                  Patient History   Past Medical History:   Diagnosis Date    Disorder of the skin and subcutaneous tissue, unspecified     Back skin lesion    Old myocardial infarction 04/30/2018    History of heart attack    Personal history of other diseases of the circulatory system     History of coronary artery disease    Personal history of other diseases of the respiratory system     History of lung disease    Personal history of transient ischemic attack (TIA), and cerebral infarction without residual deficits     History of transient cerebral ischemia    Pure hypercholesterolemia, unspecified     High cholesterol     Past Surgical History:   Procedure Laterality Date    APPENDECTOMY  02/18/2022    Appendectomy    BACK SURGERY  07/01/2016    Back Surgery    CATARACT EXTRACTION  05/29/2018    Cataract Extraction    HEMORRHOID SURGERY  07/01/2016    Hemorrhoidectomy    HYSTERECTOMY   02/18/2022    Hysterectomy    MR HEAD ANGIO WO IV CONTRAST  2/27/2023    MR HEAD ANGIO WO IV CONTRAST PAR MRI    MR NECK ANGIO WO IV CONTRAST  2/27/2023    MR NECK ANGIO WO IV CONTRAST PAR MRI    OTHER SURGICAL HISTORY  02/16/2022    Carpal tunnel surgery    TONSILLECTOMY  07/01/2016    Tonsillectomy     Family History   Problem Relation Name Age of Onset    Other (cerebrovascular accident) Mother      Diabetes Father      Other (Heart Problem) Father      Other (Malignant Neoplasm) Brother       Social History     Tobacco Use    Smoking status: Never    Smokeless tobacco: Never   Substance Use Topics    Alcohol use: Never    Drug use: Never       Physical Exam   ED Triage Vitals [01/03/24 1129]   Temp Heart Rate Resp BP   36.4 °C (97.5 °F) 87 16 (!) 212/91      SpO2 Temp Source Heart Rate Source Patient Position   98 % Temporal Monitor Sitting      BP Location FiO2 (%)     Right arm --       Physical Exam  Vitals and nursing note reviewed.   Constitutional:       Appearance: Normal appearance.   HENT:      Head: Atraumatic.      Right Ear: External ear normal.      Left Ear: External ear normal.      Nose: Nose normal.      Mouth/Throat:      Mouth: Mucous membranes are moist.      Pharynx: Posterior oropharyngeal erythema present. No pharyngeal swelling or oropharyngeal exudate.      Tonsils: No tonsillar exudate or tonsillar abscesses.   Eyes:      Extraocular Movements: Extraocular movements intact.      Pupils: Pupils are equal, round, and reactive to light.   Cardiovascular:      Rate and Rhythm: Normal rate and regular rhythm.      Pulses: Normal pulses.   Pulmonary:      Effort: Pulmonary effort is normal.      Breath sounds: Normal breath sounds.   Abdominal:      Palpations: Abdomen is soft.      Tenderness: There is no abdominal tenderness.   Musculoskeletal:         General: No tenderness. Normal range of motion.      Cervical back: Normal range of motion and neck supple. No rigidity or tenderness.    Skin:     General: Skin is warm and dry.   Neurological:      General: No focal deficit present.      Mental Status: She is alert and oriented to person, place, and time. Mental status is at baseline.   Psychiatric:         Mood and Affect: Mood normal.         Behavior: Behavior normal.         ED Course & MDM   ED Course as of 01/04/24 0627 Wed Jan 03, 2024   2326 Reevaluation patient is ambulatory and tolerating p.o.  Discussed findings with the patient.  Patient agrees to follow-up as outpatient return to ED if having worsening symptoms or other concerns. [JA]      ED Course User Index  [JA] Abiel Rojas DO         Diagnoses as of 01/04/24 0627   Generalized weakness   Dehydration   Upper respiratory tract infection, unspecified type       Medical Decision Making  Differential diagnosis: Infection, dehydration, UTI, upper respiratory infection, CVA, other    Patient presenting for evaluation of generalized weakness.  Patient notes generalized malaise.  Patient states symptoms started for the last week.  Patient believes she had a urinary tract infection 1 week ago.  Patient states she was prescribed Macrobid by her primary care physician.  Patient took 2 doses of medication but she stated that it did not agree with her and that she was told her urinalysis was negative thus she discontinued the medication.  Patient noted a small amount of sore throat.  Patient notes hoarseness and chronic dry cough.  Patient states she is having difficulty walking due to generalized weakness.    No neurodeficit on exam.  Patient is ambulatory in the ED.  Borderline positive orthostatics.  Improving with IV fluids in the ED.  Patient tolerating p.o.  Urinalysis negative for infection but does show 1+ ketones consistent with dehydration.  Patient notes a hoarseness in her throat and sore throat.  Patient notes that she does easily get diarrhea from antibiotics.  Question as to whether or not she has an upper respiratory  infection that may be contributing to dehydration.  Will cover with azithromycin to cover for atypical agents as well as strep.  Patient agrees to follow-up with her primary care physician return to ED having worsening symptoms or other concerns.        Procedure  Procedures     Abiel Rojas DO  01/04/24 0627

## 2024-01-04 NOTE — TELEPHONE ENCOUNTER
Pt called and states that she was in the ED for an URI. Pt states that she has an appt with you on the 24th but doesn't know if you would want to see her earlier. Pt states that they gave her a zpak. The ED told pt to reach out and let you know.

## 2024-01-24 ENCOUNTER — OFFICE VISIT (OUTPATIENT)
Dept: PRIMARY CARE | Facility: CLINIC | Age: 89
End: 2024-01-24
Payer: MEDICARE

## 2024-01-24 VITALS
DIASTOLIC BLOOD PRESSURE: 74 MMHG | HEIGHT: 58 IN | WEIGHT: 110.4 LBS | BODY MASS INDEX: 23.18 KG/M2 | SYSTOLIC BLOOD PRESSURE: 124 MMHG

## 2024-01-24 DIAGNOSIS — E78.2 MIXED HYPERLIPIDEMIA: ICD-10-CM

## 2024-01-24 DIAGNOSIS — I10 PRIMARY HYPERTENSION: ICD-10-CM

## 2024-01-24 DIAGNOSIS — R73.9 HYPERGLYCEMIA: ICD-10-CM

## 2024-01-24 DIAGNOSIS — R30.0 DYSURIA: ICD-10-CM

## 2024-01-24 DIAGNOSIS — Z00.00 ANNUAL PHYSICAL EXAM: Primary | ICD-10-CM

## 2024-01-24 LAB
CHOLEST SERPL-MCNC: 179 MG/DL (ref 0–199)
CHOLESTEROL/HDL RATIO: 2.4
EST. AVERAGE GLUCOSE BLD GHB EST-MCNC: 120 MG/DL
HBA1C MFR BLD: 5.8 %
HDLC SERPL-MCNC: 73.9 MG/DL
LDLC SERPL CALC-MCNC: 82 MG/DL
NON HDL CHOLESTEROL: 105 MG/DL (ref 0–149)
TRIGL SERPL-MCNC: 115 MG/DL (ref 0–149)
VLDL: 23 MG/DL (ref 0–40)

## 2024-01-24 PROCEDURE — 83036 HEMOGLOBIN GLYCOSYLATED A1C: CPT

## 2024-01-24 PROCEDURE — 80061 LIPID PANEL: CPT

## 2024-01-24 PROCEDURE — 99213 OFFICE O/P EST LOW 20 MIN: CPT | Performed by: FAMILY MEDICINE

## 2024-01-24 PROCEDURE — 1036F TOBACCO NON-USER: CPT | Performed by: FAMILY MEDICINE

## 2024-01-24 PROCEDURE — 1157F ADVNC CARE PLAN IN RCRD: CPT | Performed by: FAMILY MEDICINE

## 2024-01-24 PROCEDURE — G0439 PPPS, SUBSEQ VISIT: HCPCS | Performed by: FAMILY MEDICINE

## 2024-01-24 PROCEDURE — 1126F AMNT PAIN NOTED NONE PRSNT: CPT | Performed by: FAMILY MEDICINE

## 2024-01-24 PROCEDURE — 36415 COLL VENOUS BLD VENIPUNCTURE: CPT

## 2024-01-24 PROCEDURE — 3074F SYST BP LT 130 MM HG: CPT | Performed by: FAMILY MEDICINE

## 2024-01-24 PROCEDURE — 3078F DIAST BP <80 MM HG: CPT | Performed by: FAMILY MEDICINE

## 2024-01-24 PROCEDURE — 1170F FXNL STATUS ASSESSED: CPT | Performed by: FAMILY MEDICINE

## 2024-01-24 PROCEDURE — 1159F MED LIST DOCD IN RCRD: CPT | Performed by: FAMILY MEDICINE

## 2024-01-24 ASSESSMENT — ACTIVITIES OF DAILY LIVING (ADL)
DOING_HOUSEWORK: INDEPENDENT
MANAGING_FINANCES: INDEPENDENT
BATHING: INDEPENDENT
DRESSING: INDEPENDENT
TAKING_MEDICATION: INDEPENDENT
GROCERY_SHOPPING: INDEPENDENT

## 2024-01-24 ASSESSMENT — ENCOUNTER SYMPTOMS
DEPRESSION: 0
LOSS OF SENSATION IN FEET: 0
OCCASIONAL FEELINGS OF UNSTEADINESS: 0

## 2024-01-24 ASSESSMENT — PATIENT HEALTH QUESTIONNAIRE - PHQ9
1. LITTLE INTEREST OR PLEASURE IN DOING THINGS: NOT AT ALL
SUM OF ALL RESPONSES TO PHQ9 QUESTIONS 1 AND 2: 0
2. FEELING DOWN, DEPRESSED OR HOPELESS: NOT AT ALL

## 2024-01-24 ASSESSMENT — PAIN SCALES - GENERAL: PAINLEVEL: 0-NO PAIN

## 2024-01-24 NOTE — PROGRESS NOTES
"Subjective   Patient ID: Lashonda Bran is a 89 y.o. female who presents for Medicare Annual Wellness Visit Subsequent (Medicare annual exam, pt is not fasting.), Annual Exam (Annual physical exam.), and UTI (Ongoing UTI concern, lower abdominal discomfort. ).    HPI   Patient here for Medicare annual.  Patient complains of UTI symptoms repeatedly.  She has had several urinalyses that did not show an infection.  She has pressure and burning on voiding at times.    Review of Systems    Objective   /74 (BP Location: Left arm, Patient Position: Sitting, BP Cuff Size: Adult)   Ht 1.485 m (4' 10.47\")   Wt 50.1 kg (110 lb 6.4 oz)   BMI 22.71 kg/m²     Physical Exam  Alert, pleasant and in no acute distress.  Heart: Regular rate and rhythm without murmur  Lungs: Clear to auscultation  Lower extremities: No edema  Assessment/Plan   Problem List Items Addressed This Visit             ICD-10-CM       Cardiac and Vasculature    Hyperlipidemia E78.5    Relevant Orders    Lipid Panel     Other Visit Diagnoses         Codes    Annual physical exam    -  Primary Z00.00    Dysuria     R30.0    Relevant Orders    Referral to Urology    Primary hypertension     I10    Hyperglycemia     R73.9    Relevant Orders    Hemoglobin A1C        1.  Health maintenance: Care gaps discussed.  2.  Dysuria: Patient with recurrent urinary symptoms but negative urine cultures.  We will refer to urology.  Discussed the need for an evaluation to determine how to make her feel better and to help prevent issues.  3.  Hypertension/hyperlipidemia: Under good control  4.  Hyperglycemia: Patient with mildly elevated blood sugars in the past.  We will recheck an A1c.  We will call patient in 3 to 5 days with routine labs.       "

## 2024-02-08 ENCOUNTER — APPOINTMENT (OUTPATIENT)
Dept: PODIATRY | Facility: CLINIC | Age: 89
End: 2024-02-08
Payer: MEDICARE

## 2024-02-28 ENCOUNTER — APPOINTMENT (OUTPATIENT)
Dept: PODIATRY | Facility: CLINIC | Age: 89
End: 2024-02-28
Payer: MEDICARE

## 2024-02-28 ENCOUNTER — TELEPHONE (OUTPATIENT)
Dept: PRIMARY CARE | Facility: CLINIC | Age: 89
End: 2024-02-28

## 2024-02-28 NOTE — TELEPHONE ENCOUNTER
Patient called stating she would like to speak with you in regards to not be able to urinating for a couple of days

## 2024-03-08 ENCOUNTER — APPOINTMENT (OUTPATIENT)
Dept: RADIOLOGY | Facility: HOSPITAL | Age: 89
End: 2024-03-08
Payer: MEDICARE

## 2024-03-08 ENCOUNTER — TELEPHONE (OUTPATIENT)
Dept: CARDIOLOGY | Facility: CLINIC | Age: 89
End: 2024-03-08

## 2024-03-08 ENCOUNTER — APPOINTMENT (OUTPATIENT)
Dept: CARDIOLOGY | Facility: CLINIC | Age: 89
End: 2024-03-08
Payer: MEDICARE

## 2024-03-08 ENCOUNTER — APPOINTMENT (OUTPATIENT)
Dept: CARDIOLOGY | Facility: HOSPITAL | Age: 89
End: 2024-03-08
Payer: MEDICARE

## 2024-03-08 ENCOUNTER — HOSPITAL ENCOUNTER (EMERGENCY)
Facility: HOSPITAL | Age: 89
Discharge: HOME | End: 2024-03-08
Attending: EMERGENCY MEDICINE
Payer: MEDICARE

## 2024-03-08 VITALS
DIASTOLIC BLOOD PRESSURE: 81 MMHG | SYSTOLIC BLOOD PRESSURE: 162 MMHG | HEIGHT: 58 IN | BODY MASS INDEX: 23.09 KG/M2 | HEART RATE: 83 BPM | WEIGHT: 110 LBS | OXYGEN SATURATION: 96 % | RESPIRATION RATE: 16 BRPM | TEMPERATURE: 97.5 F

## 2024-03-08 DIAGNOSIS — R53.1 GENERALIZED WEAKNESS: Primary | ICD-10-CM

## 2024-03-08 LAB
ALBUMIN SERPL BCP-MCNC: 4.3 G/DL (ref 3.4–5)
ALP SERPL-CCNC: 87 U/L (ref 33–136)
ALT SERPL W P-5'-P-CCNC: 10 U/L (ref 7–45)
ANION GAP SERPL CALC-SCNC: 12 MMOL/L (ref 10–20)
APPEARANCE UR: CLEAR
AST SERPL W P-5'-P-CCNC: 16 U/L (ref 9–39)
BASOPHILS # BLD AUTO: 0.02 X10*3/UL (ref 0–0.1)
BASOPHILS NFR BLD AUTO: 0.3 %
BILIRUB SERPL-MCNC: 0.6 MG/DL (ref 0–1.2)
BILIRUB UR STRIP.AUTO-MCNC: NEGATIVE MG/DL
BUN SERPL-MCNC: 14 MG/DL (ref 6–23)
CALCIUM SERPL-MCNC: 9.2 MG/DL (ref 8.6–10.3)
CARDIAC TROPONIN I PNL SERPL HS: 7 NG/L (ref 0–13)
CHLORIDE SERPL-SCNC: 100 MMOL/L (ref 98–107)
CO2 SERPL-SCNC: 28 MMOL/L (ref 21–32)
COLOR UR: YELLOW
CREAT SERPL-MCNC: 0.75 MG/DL (ref 0.5–1.05)
EGFRCR SERPLBLD CKD-EPI 2021: 73 ML/MIN/1.73M*2
EOSINOPHIL # BLD AUTO: 0.01 X10*3/UL (ref 0–0.4)
EOSINOPHIL NFR BLD AUTO: 0.2 %
ERYTHROCYTE [DISTWIDTH] IN BLOOD BY AUTOMATED COUNT: 14.5 % (ref 11.5–14.5)
FLUAV RNA RESP QL NAA+PROBE: NOT DETECTED
FLUBV RNA RESP QL NAA+PROBE: NOT DETECTED
GLUCOSE SERPL-MCNC: 118 MG/DL (ref 74–99)
GLUCOSE UR STRIP.AUTO-MCNC: NEGATIVE MG/DL
HCT VFR BLD AUTO: 42.5 % (ref 36–46)
HGB BLD-MCNC: 14.5 G/DL (ref 12–16)
IMM GRANULOCYTES # BLD AUTO: 0.02 X10*3/UL (ref 0–0.5)
IMM GRANULOCYTES NFR BLD AUTO: 0.3 % (ref 0–0.9)
KETONES UR STRIP.AUTO-MCNC: NEGATIVE MG/DL
LEUKOCYTE ESTERASE UR QL STRIP.AUTO: NEGATIVE
LYMPHOCYTES # BLD AUTO: 1.17 X10*3/UL (ref 0.8–3)
LYMPHOCYTES NFR BLD AUTO: 19.1 %
MAGNESIUM SERPL-MCNC: 2.06 MG/DL (ref 1.6–2.4)
MCH RBC QN AUTO: 32 PG (ref 26–34)
MCHC RBC AUTO-ENTMCNC: 34.1 G/DL (ref 32–36)
MCV RBC AUTO: 94 FL (ref 80–100)
MONOCYTES # BLD AUTO: 0.47 X10*3/UL (ref 0.05–0.8)
MONOCYTES NFR BLD AUTO: 7.7 %
NEUTROPHILS # BLD AUTO: 4.43 X10*3/UL (ref 1.6–5.5)
NEUTROPHILS NFR BLD AUTO: 72.4 %
NITRITE UR QL STRIP.AUTO: NEGATIVE
NRBC BLD-RTO: 0 /100 WBCS (ref 0–0)
PH UR STRIP.AUTO: 6 [PH]
PLATELET # BLD AUTO: 183 X10*3/UL (ref 150–450)
POTASSIUM SERPL-SCNC: 3.8 MMOL/L (ref 3.5–5.3)
PROT SERPL-MCNC: 8.1 G/DL (ref 6.4–8.2)
PROT UR STRIP.AUTO-MCNC: NEGATIVE MG/DL
RBC # BLD AUTO: 4.53 X10*6/UL (ref 4–5.2)
RBC # UR STRIP.AUTO: NEGATIVE /UL
SARS-COV-2 RNA RESP QL NAA+PROBE: NOT DETECTED
SODIUM SERPL-SCNC: 136 MMOL/L (ref 136–145)
SP GR UR STRIP.AUTO: 1.01
UROBILINOGEN UR STRIP.AUTO-MCNC: <2 MG/DL
WBC # BLD AUTO: 6.1 X10*3/UL (ref 4.4–11.3)

## 2024-03-08 PROCEDURE — 84484 ASSAY OF TROPONIN QUANT: CPT | Performed by: PHYSICIAN ASSISTANT

## 2024-03-08 PROCEDURE — 84075 ASSAY ALKALINE PHOSPHATASE: CPT | Performed by: PHYSICIAN ASSISTANT

## 2024-03-08 PROCEDURE — 81003 URINALYSIS AUTO W/O SCOPE: CPT | Performed by: PHYSICIAN ASSISTANT

## 2024-03-08 PROCEDURE — 85025 COMPLETE CBC W/AUTO DIFF WBC: CPT | Performed by: PHYSICIAN ASSISTANT

## 2024-03-08 PROCEDURE — 71046 X-RAY EXAM CHEST 2 VIEWS: CPT | Mod: FOREIGN READ | Performed by: RADIOLOGY

## 2024-03-08 PROCEDURE — 96360 HYDRATION IV INFUSION INIT: CPT

## 2024-03-08 PROCEDURE — 99285 EMERGENCY DEPT VISIT HI MDM: CPT | Mod: 25

## 2024-03-08 PROCEDURE — 2500000004 HC RX 250 GENERAL PHARMACY W/ HCPCS (ALT 636 FOR OP/ED): Performed by: EMERGENCY MEDICINE

## 2024-03-08 PROCEDURE — 36415 COLL VENOUS BLD VENIPUNCTURE: CPT | Performed by: PHYSICIAN ASSISTANT

## 2024-03-08 PROCEDURE — 93005 ELECTROCARDIOGRAM TRACING: CPT

## 2024-03-08 PROCEDURE — 83735 ASSAY OF MAGNESIUM: CPT | Performed by: PHYSICIAN ASSISTANT

## 2024-03-08 PROCEDURE — 70450 CT HEAD/BRAIN W/O DYE: CPT

## 2024-03-08 PROCEDURE — 87636 SARSCOV2 & INF A&B AMP PRB: CPT | Performed by: PHYSICIAN ASSISTANT

## 2024-03-08 PROCEDURE — 71046 X-RAY EXAM CHEST 2 VIEWS: CPT

## 2024-03-08 RX ADMIN — SODIUM CHLORIDE, POTASSIUM CHLORIDE, SODIUM LACTATE AND CALCIUM CHLORIDE 1000 ML: 600; 310; 30; 20 INJECTION, SOLUTION INTRAVENOUS at 16:26

## 2024-03-08 ASSESSMENT — PAIN - FUNCTIONAL ASSESSMENT: PAIN_FUNCTIONAL_ASSESSMENT: 0-10

## 2024-03-08 ASSESSMENT — COLUMBIA-SUICIDE SEVERITY RATING SCALE - C-SSRS
6. HAVE YOU EVER DONE ANYTHING, STARTED TO DO ANYTHING, OR PREPARED TO DO ANYTHING TO END YOUR LIFE?: NO
1. IN THE PAST MONTH, HAVE YOU WISHED YOU WERE DEAD OR WISHED YOU COULD GO TO SLEEP AND NOT WAKE UP?: NO
2. HAVE YOU ACTUALLY HAD ANY THOUGHTS OF KILLING YOURSELF?: NO

## 2024-03-08 ASSESSMENT — PAIN SCALES - GENERAL
PAINLEVEL_OUTOF10: 0 - NO PAIN
PAINLEVEL_OUTOF10: 0 - NO PAIN

## 2024-03-08 NOTE — ED TRIAGE NOTES
The patient was seen and examined in triage.    History of Present Illness: The patient is a 89-year-old female presents emergency department due to generalized weakness.  She reports in the morning when she wakes up she feels very weak and unsteady on her feet.  She reports that sometimes this causes her to fall.  Last week she did injure her ribs but she does not believe she broke anything because she is not having trouble breathing and she does not really have any pain to the ribs.  She denies any fever or chills.  She has not had changes to urination.  She denies changes to bowel movements.  She denies any chest pains or shortness of breath.  Denies abdominal pains.  She has no further complaints.    Brief Physical Exam:  Exam is limited by the patient sitting in a chair in triage.   Heart: Regular rate and rhythm.   Lungs: Clear to auscultation bilaterally.   Abdomen: Soft, nondistended, normoactive bowel sounds, nontender     Plan: Appropriate labs and diagnostic imaging were ordered.      For the remainder of the patient's workup and ED course, please refer to the main ED provider note. We discussed need for diagnostic testing including laboratory studies and imaging.  We also discussed that they may be asked to wait in the waiting room while these tests are pending.  They understand that if they choose to leave without having the testing completed or resulted that we cannot rule out acute life threatening illnesses and the risks involved could lead to worsening condition, permanent disability or even death.      Disclaimer: This note was dictated by speech recognition. Minor errors in transcription may be present. Please call if questions.

## 2024-03-08 NOTE — ED PROVIDER NOTES
HPI   Chief Complaint   Patient presents with    Weakness, Gen     Pt to ER via triage, c/o not feeling well for about a week now. Pt c/o generalized weakness, body aches, feeling warm. Eating and drinking well. Pt states she called her doctor but he was unable to see her.        Patient is a 89-year-old female, medical history significant for remote MI, history of chronic lung disease, cardiovascular disease, TIA that presents to the emergency department multiple bouts of near syncope.  Patient states that this started about a month ago.  She was on Flomax and had 2 syncopal episodes.  She presented here and had an evaluation which was unremarkable.  She states for the past few days, she has had sensation where she has been nauseated, has had some tingling, and is felt lightheaded like she was going to pass out.  She denies fever.  She denies chills or sweats.  She has had some intermittent headache.  She denies any other recent change in medications.                          Vito Coma Scale Score: 15                     Patient History   Past Medical History:   Diagnosis Date    Disorder of the skin and subcutaneous tissue, unspecified     Back skin lesion    Old myocardial infarction 04/30/2018    History of heart attack    Personal history of other diseases of the circulatory system     History of coronary artery disease    Personal history of other diseases of the respiratory system     History of lung disease    Personal history of transient ischemic attack (TIA), and cerebral infarction without residual deficits     History of transient cerebral ischemia    Pure hypercholesterolemia, unspecified     High cholesterol     Past Surgical History:   Procedure Laterality Date    APPENDECTOMY  02/18/2022    Appendectomy    BACK SURGERY  07/01/2016    Back Surgery    CATARACT EXTRACTION  05/29/2018    Cataract Extraction    HEMORRHOID SURGERY  07/01/2016    Hemorrhoidectomy    HYSTERECTOMY  02/18/2022     Hysterectomy    MR HEAD ANGIO WO IV CONTRAST  2/27/2023    MR HEAD ANGIO WO IV CONTRAST PAR MRI    MR NECK ANGIO WO IV CONTRAST  2/27/2023    MR NECK ANGIO WO IV CONTRAST PAR MRI    OTHER SURGICAL HISTORY  02/16/2022    Carpal tunnel surgery    TONSILLECTOMY  07/01/2016    Tonsillectomy     Family History   Problem Relation Name Age of Onset    Other (cerebrovascular accident) Mother      Diabetes Father      Other (Heart Problem) Father      Other (Malignant Neoplasm) Brother       Social History     Tobacco Use    Smoking status: Never    Smokeless tobacco: Never   Substance Use Topics    Alcohol use: Never    Drug use: Never       Physical Exam   ED Triage Vitals [03/08/24 1410]   Temperature Heart Rate Respirations BP   36.4 °C (97.5 °F) 95 18 170/74      Pulse Ox Temp Source Heart Rate Source Patient Position   96 % Oral Monitor Sitting      BP Location FiO2 (%)     Right arm --       Physical Exam  Vitals and nursing note reviewed.   Constitutional:       General: She is not in acute distress.     Appearance: Normal appearance. She is well-developed.   HENT:      Head: Normocephalic and atraumatic.   Eyes:      Extraocular Movements: Extraocular movements intact.      Conjunctiva/sclera: Conjunctivae normal.      Pupils: Pupils are equal, round, and reactive to light.   Cardiovascular:      Rate and Rhythm: Normal rate and regular rhythm.      Heart sounds: No murmur heard.  Pulmonary:      Effort: Pulmonary effort is normal. No respiratory distress.      Breath sounds: Normal breath sounds.   Abdominal:      General: Abdomen is flat.      Palpations: Abdomen is soft.      Tenderness: There is no abdominal tenderness.   Musculoskeletal:         General: No swelling.      Cervical back: Neck supple.   Skin:     General: Skin is warm and dry.      Capillary Refill: Capillary refill takes less than 2 seconds.   Neurological:      General: No focal deficit present.      Mental Status: She is alert and oriented to  person, place, and time.   Psychiatric:         Mood and Affect: Mood normal.         ED Course & MDM   ED Course as of 03/08/24 1722   Fri Mar 08, 2024   1701 CT head shows no acute intracranial abnormalities. [MK]   1701 Chest x-ray shows some scant lower lobe opacities without definitive pneumonia. [MK]   1701 CBC and chemistry panel unremarkable.  Cardiac enzymes normal. [MK]   1702 COVID, flu, negative. [MK]      ED Course User Index  [MK] Juan Jose Moore MD         Diagnoses as of 03/08/24 1722   Generalized weakness       Medical Decision Making  Medical Decision Making: Patient presents to the emergency department weakness and near syncope.  Metabolic workup was pursued.  Patient's labs are essentially unremarkable.  EKG shows no dysrhythmia.  Orthostatic vital signs were obtained and were unremarkable.  Imaging also shows no acute change.  I did discuss options with the patient.  She would rather follow-up as an outpatient.  She does not have any interest in hospitalization.  Given her unremarkable workup I do feel that this is reasonable.    EKG interpreted by myself (ED attending physician): Sinus rhythm.  Rate of 78.  Normal axis.  No acute ischemia.  No STEMI.    Differential Diagnoses Considered: Near syncope, dehydration, electrolyte disturbance, medication effect    Chronic Medical Conditions Significantly Affecting Care: Coronary vascular disease    External Records Reviewed: I reviewed recent and relevant outside records including: Most recent emergency department visit discharge    Independent Interpretation of Studies:  I independently interpreted: Chest x-ray and CT obtained reviewed    Escalation of Care:  Appropriate for outpatient management    Social Determinants of Health Significantly Affecting Care:  No social determinant    Prescription Drug Consideration: IV fluids    Diagnostic testing considered: Noncontributory            Procedure  Procedures     Juan Jose Moore MD  03/08/24  [id number]

## 2024-03-08 NOTE — TELEPHONE ENCOUNTER
Pt called to schedule ov with Dr. Killian due to symptoms. Pt spoke to our central scheduling department and they scheduled her with Dr. Schulz for this afternoon.    Called pt to discuss symptoms. Pt c/o increased SOB, night sweats, and even  during the day she has a warm feeling from toes up which makes her feel dizzy like she will pass out. Pt denies any syncopal episodes.     Pt does not check home BP/HR. Pt denies chest pain. Pt has been having these symptoms for approx 1 week.    I reviewed message and pt's symptoms with Dr. Killian. Per Dr. Killian, pt needs to go to the ER for evaluation as symptoms are concerning and she will need blood work and x-rays to evaluate cause of symptoms.    Called pt to discuss plan per Dr. Killian. Pt became very upset about going to the ER. Explained rationale for ER evaluation and informed her Dr. Killian concerned about symptoms which should be evaluated. Informed pt today's appointment with Dr. Schulz will be cancelled because Dr. Killian recommends for pt to go to the ER.     Reviewed red flag symptoms that make it necessary for ER evaluation along with possible consequences if medical evaluation and/or treatment is not received. Pt verbalizes understanding of all instructions and information provided. I asked pt if she had any other questions and the pt hung up on me.

## 2024-03-09 LAB — HOLD SPECIMEN: NORMAL

## 2024-03-11 ENCOUNTER — TELEPHONE (OUTPATIENT)
Dept: PRIMARY CARE | Facility: CLINIC | Age: 89
End: 2024-03-11
Payer: MEDICARE

## 2024-03-11 NOTE — TELEPHONE ENCOUNTER
Pt was in [place] er for weakness, felt like fainting-all tests came back ok but they said to  Follow up with primary doctor-did not give any meds.  Apt?  Please advise  Ty

## 2024-03-12 ENCOUNTER — HOSPITAL ENCOUNTER (OUTPATIENT)
Dept: VASCULAR MEDICINE | Facility: CLINIC | Age: 89
Discharge: HOME | End: 2024-03-12
Payer: MEDICARE

## 2024-03-12 DIAGNOSIS — I73.89 OTHER SPECIFIED PERIPHERAL VASCULAR DISEASES (CMS-HCC): ICD-10-CM

## 2024-03-12 DIAGNOSIS — I73.9 PERIPHERAL VASCULAR DISEASE, UNSPECIFIED (CMS-HCC): ICD-10-CM

## 2024-03-12 PROCEDURE — 93923 UPR/LXTR ART STDY 3+ LVLS: CPT | Performed by: SURGERY

## 2024-03-12 PROCEDURE — 93923 UPR/LXTR ART STDY 3+ LVLS: CPT

## 2024-03-12 NOTE — TELEPHONE ENCOUNTER
This is dr. Doss pt.  Can you see her for an er follow up-being weak & faint & ran all kinds  Of tests with no major problem.    Please advise  No Thursday or Fridays  Ty

## 2024-03-13 ENCOUNTER — OFFICE VISIT (OUTPATIENT)
Dept: PRIMARY CARE | Facility: CLINIC | Age: 89
End: 2024-03-13
Payer: MEDICARE

## 2024-03-13 VITALS
OXYGEN SATURATION: 96 % | HEART RATE: 74 BPM | SYSTOLIC BLOOD PRESSURE: 140 MMHG | BODY MASS INDEX: 23.07 KG/M2 | DIASTOLIC BLOOD PRESSURE: 64 MMHG | WEIGHT: 110.4 LBS

## 2024-03-13 DIAGNOSIS — R61 NIGHT SWEATS: Primary | ICD-10-CM

## 2024-03-13 DIAGNOSIS — R82.998 OTHER ABNORMAL FINDINGS IN URINE: ICD-10-CM

## 2024-03-13 PROBLEM — Z86.73 HISTORY OF TRANSIENT ISCHEMIC ATTACK: Status: ACTIVE | Noted: 2024-03-13

## 2024-03-13 PROBLEM — R26.9 ABNORMAL GAIT: Status: ACTIVE | Noted: 2023-01-31

## 2024-03-13 PROBLEM — R53.83 FATIGUE: Status: ACTIVE | Noted: 2024-03-13

## 2024-03-13 PROBLEM — I25.2 HISTORY OF MYOCARDIAL INFARCTION: Status: ACTIVE | Noted: 2023-01-31

## 2024-03-13 PROBLEM — R73.9 HYPERGLYCEMIA: Status: ACTIVE | Noted: 2024-03-13

## 2024-03-13 PROBLEM — J06.9 UPPER RESPIRATORY TRACT INFECTION: Status: ACTIVE | Noted: 2024-03-13

## 2024-03-13 PROBLEM — L98.9 SKIN LESION: Status: ACTIVE | Noted: 2024-03-13

## 2024-03-13 PROBLEM — E86.0 DEHYDRATION: Status: ACTIVE | Noted: 2024-03-13

## 2024-03-13 LAB
APPEARANCE UR: CLEAR
BILIRUB UR STRIP.AUTO-MCNC: NEGATIVE MG/DL
COLOR UR: NORMAL
CRP SERPL-MCNC: 0.26 MG/DL
GLUCOSE UR STRIP.AUTO-MCNC: NORMAL MG/DL
KETONES UR STRIP.AUTO-MCNC: NEGATIVE MG/DL
LEUKOCYTE ESTERASE UR QL STRIP.AUTO: NEGATIVE
NITRITE UR QL STRIP.AUTO: NEGATIVE
PH UR STRIP.AUTO: 5.5 [PH]
PROT UR STRIP.AUTO-MCNC: NEGATIVE MG/DL
RBC # UR STRIP.AUTO: NEGATIVE /UL
SP GR UR STRIP.AUTO: 1.01
TSH SERPL-ACNC: 2.71 MIU/L (ref 0.44–3.98)
UROBILINOGEN UR STRIP.AUTO-MCNC: NORMAL MG/DL

## 2024-03-13 PROCEDURE — 81003 URINALYSIS AUTO W/O SCOPE: CPT

## 2024-03-13 PROCEDURE — 3077F SYST BP >= 140 MM HG: CPT | Performed by: STUDENT IN AN ORGANIZED HEALTH CARE EDUCATION/TRAINING PROGRAM

## 2024-03-13 PROCEDURE — 86140 C-REACTIVE PROTEIN: CPT

## 2024-03-13 PROCEDURE — 99213 OFFICE O/P EST LOW 20 MIN: CPT | Performed by: STUDENT IN AN ORGANIZED HEALTH CARE EDUCATION/TRAINING PROGRAM

## 2024-03-13 PROCEDURE — 1160F RVW MEDS BY RX/DR IN RCRD: CPT | Performed by: STUDENT IN AN ORGANIZED HEALTH CARE EDUCATION/TRAINING PROGRAM

## 2024-03-13 PROCEDURE — 36415 COLL VENOUS BLD VENIPUNCTURE: CPT

## 2024-03-13 PROCEDURE — 3078F DIAST BP <80 MM HG: CPT | Performed by: STUDENT IN AN ORGANIZED HEALTH CARE EDUCATION/TRAINING PROGRAM

## 2024-03-13 PROCEDURE — 1157F ADVNC CARE PLAN IN RCRD: CPT | Performed by: STUDENT IN AN ORGANIZED HEALTH CARE EDUCATION/TRAINING PROGRAM

## 2024-03-13 PROCEDURE — 1159F MED LIST DOCD IN RCRD: CPT | Performed by: STUDENT IN AN ORGANIZED HEALTH CARE EDUCATION/TRAINING PROGRAM

## 2024-03-13 PROCEDURE — 1126F AMNT PAIN NOTED NONE PRSNT: CPT | Performed by: STUDENT IN AN ORGANIZED HEALTH CARE EDUCATION/TRAINING PROGRAM

## 2024-03-13 PROCEDURE — 1036F TOBACCO NON-USER: CPT | Performed by: STUDENT IN AN ORGANIZED HEALTH CARE EDUCATION/TRAINING PROGRAM

## 2024-03-13 PROCEDURE — 84443 ASSAY THYROID STIM HORMONE: CPT

## 2024-03-13 RX ORDER — TAMSULOSIN HYDROCHLORIDE 0.4 MG/1
CAPSULE ORAL
COMMUNITY
Start: 2024-02-26 | End: 2024-03-13 | Stop reason: ALTCHOICE

## 2024-03-13 RX ORDER — ASPIRIN 81 MG/1
81 TABLET ORAL DAILY
COMMUNITY

## 2024-03-13 ASSESSMENT — ENCOUNTER SYMPTOMS: DEPRESSION: 0

## 2024-03-13 ASSESSMENT — PAIN SCALES - GENERAL: PAINLEVEL: 0-NO PAIN

## 2024-03-13 NOTE — PROGRESS NOTES
Subjective   Patient ID: Lashonda Bran is a 89 y.o. female who presents for Follow-up (Syncope./) and Night Sweats.    HPI comes in for ER follow-up.  She had a severe side effect to Flomax.  All symptoms are improving but she has had intermittent night sweats for several months.  No other associated symptoms    Review of Systems  Constitutional: Positive intermittent night sweats for several months  Eyes: no blurred vision or visual disturbance  ENT: no hearing loss, no congestion, no nasal discharge, no hoarseness and no sore throat.   Cardiovascular: no chest pain, no edema, no palps and no syncope.   Respiratory: no cough,no s.o.b. and no wheezing  Gastrointestinal: no abdominal pain, No C/D no N/V, no blood in stools  Genitourinary: no dysuria, no change in urinary frequency, no urinary hesitancy and no feelings of urinary urgency.   Musculoskeletal: no arthralgias,  no back pain and no myalgias.   Integumentary: no new skin lesions and no rashes.   Neurological: no difficulty walking, no headache, no limb weakness, no numbness and no tingling.   Psychiatric: no anxiety, no depression, no anhedonia and no substance use disorders.   Endocrine: no recent weight gain and no recent weight loss.   Hematologic/Lymphatic: no tendency for easy bruising and no swollen glands.  Objective   /64 (BP Location: Right arm, Patient Position: Sitting, BP Cuff Size: Adult)   Pulse 74   Wt 50.1 kg (110 lb 6.4 oz)   SpO2 96%   BMI 23.07 kg/m²     Physical Exam  gen- a & o x 3, nad, pleasant  heent- eomi, perrla, ear canals patent, TM's non-erythematous, no fluid, frontal and maxillary sinus's nontender  neck- supple, nontender, no palpable or enlarged nodes, no thyromegaly  heart- rrr, no murmurs  lungs- cta b/l , no w/r/r  chest- symmetric, nontender  ab- soft, nontender, no palpable organomegaly, postive bowel sounds  ex's- no c/c/e  neuro- CNs 2-12 grossly intact, full sensation and strength in all  extremities    Assessment/Plan     1.  Intermittent night sweats for several months.  She did have a recent ER visit.  She had CBC CMP chest x-ray.  We will order additional blood work and urine studies.  We were evaluating the algorithm for night sweats.  Consider CT scan of chest abdomen pelvis in the future consider echocardiogram or further workup.  We will evaluate lab work first and decide on plan of action

## 2024-03-13 NOTE — PATIENT INSTRUCTIONS
1.  Intermittent night sweats for several months.  She did have a recent ER visit.  She had CBC CMP chest x-ray.  We will order additional blood work and urine studies.  We were evaluating the algorithm for night sweats.  Consider CT scan of chest abdomen pelvis in the future consider echocardiogram or further workup.  We will evaluate lab work first and decide on plan of action

## 2024-03-16 LAB
ATRIAL RATE: 78 BPM
P AXIS: 38 DEGREES
PR INTERVAL: 146 MS
Q ONSET: 249 MS
QRS COUNT: 12 BEATS
QRS DURATION: 78 MS
QT INTERVAL: 411 MS
QTC CALCULATION(BAZETT): 469 MS
QTC FREDERICIA: 448 MS
R AXIS: 33 DEGREES
T AXIS: 30 DEGREES
T OFFSET: 455 MS
VENTRICULAR RATE: 78 BPM

## 2024-03-18 ENCOUNTER — TELEPHONE (OUTPATIENT)
Dept: PRIMARY CARE | Facility: CLINIC | Age: 89
End: 2024-03-18
Payer: MEDICARE

## 2024-03-18 NOTE — TELEPHONE ENCOUNTER
Patient called asking for lab results. She wants to know why does she have night sweats if theres no concerns

## 2024-04-17 ENCOUNTER — APPOINTMENT (OUTPATIENT)
Dept: PRIMARY CARE | Facility: CLINIC | Age: 89
End: 2024-04-17
Payer: MEDICARE

## 2024-04-19 ENCOUNTER — TELEPHONE (OUTPATIENT)
Dept: CARDIOLOGY | Facility: CLINIC | Age: 89
End: 2024-04-19
Payer: MEDICARE

## 2024-04-19 DIAGNOSIS — I10 ESSENTIAL HYPERTENSION: ICD-10-CM

## 2024-04-19 DIAGNOSIS — E78.5 DYSLIPIDEMIA: ICD-10-CM

## 2024-04-19 NOTE — TELEPHONE ENCOUNTER
PT is here, she needs refills on her meds. --- She is OUTR of Metoprol,  but needs her other med. filled too,  amylodipine.   [facility] [address]

## 2024-04-20 RX ORDER — AMLODIPINE BESYLATE 5 MG/1
5 TABLET ORAL DAILY
Qty: 90 TABLET | Refills: 3 | Status: SHIPPED | OUTPATIENT
Start: 2024-04-20 | End: 2025-04-20

## 2024-04-20 RX ORDER — METOPROLOL TARTRATE 25 MG/1
25 TABLET, FILM COATED ORAL
Qty: 180 TABLET | Refills: 3 | Status: SHIPPED | OUTPATIENT
Start: 2024-04-20 | End: 2025-04-20

## 2024-05-08 PROBLEM — I73.9 PERIPHERAL VASCULAR DISEASE (CMS-HCC): Status: ACTIVE | Noted: 2024-05-08

## 2024-05-21 ENCOUNTER — OFFICE VISIT (OUTPATIENT)
Dept: CARDIOLOGY | Facility: CLINIC | Age: 89
End: 2024-05-21
Payer: MEDICARE

## 2024-05-21 VITALS
SYSTOLIC BLOOD PRESSURE: 130 MMHG | BODY MASS INDEX: 21.97 KG/M2 | DIASTOLIC BLOOD PRESSURE: 70 MMHG | WEIGHT: 109 LBS | HEIGHT: 59 IN | HEART RATE: 83 BPM | OXYGEN SATURATION: 94 %

## 2024-05-21 DIAGNOSIS — I25.10 CORONARY ARTERY DISEASE WITHOUT ANGINA PECTORIS, UNSPECIFIED VESSEL OR LESION TYPE, UNSPECIFIED WHETHER NATIVE OR TRANSPLANTED HEART: Primary | ICD-10-CM

## 2024-05-21 DIAGNOSIS — R53.82 CHRONIC FATIGUE: ICD-10-CM

## 2024-05-21 DIAGNOSIS — I10 ESSENTIAL HYPERTENSION: ICD-10-CM

## 2024-05-21 DIAGNOSIS — Z95.5 PRESENCE OF STENT IN CORONARY ARTERY: ICD-10-CM

## 2024-05-21 DIAGNOSIS — I25.2 HISTORY OF MYOCARDIAL INFARCTION: ICD-10-CM

## 2024-05-21 DIAGNOSIS — I20.89 CHRONIC STABLE ANGINA (CMS-HCC): ICD-10-CM

## 2024-05-21 DIAGNOSIS — E78.2 MIXED HYPERLIPIDEMIA: ICD-10-CM

## 2024-05-21 PROBLEM — I73.9 PERIPHERAL VASCULAR DISEASE (CMS-HCC): Status: RESOLVED | Noted: 2024-05-08 | Resolved: 2024-05-21

## 2024-05-21 PROCEDURE — 1036F TOBACCO NON-USER: CPT | Performed by: INTERNAL MEDICINE

## 2024-05-21 PROCEDURE — 1160F RVW MEDS BY RX/DR IN RCRD: CPT | Performed by: INTERNAL MEDICINE

## 2024-05-21 PROCEDURE — 1159F MED LIST DOCD IN RCRD: CPT | Performed by: INTERNAL MEDICINE

## 2024-05-21 PROCEDURE — 1157F ADVNC CARE PLAN IN RCRD: CPT | Performed by: INTERNAL MEDICINE

## 2024-05-21 PROCEDURE — 99213 OFFICE O/P EST LOW 20 MIN: CPT | Performed by: INTERNAL MEDICINE

## 2024-05-21 PROCEDURE — 3075F SYST BP GE 130 - 139MM HG: CPT | Performed by: INTERNAL MEDICINE

## 2024-05-21 PROCEDURE — 3078F DIAST BP <80 MM HG: CPT | Performed by: INTERNAL MEDICINE

## 2024-05-21 NOTE — PATIENT INSTRUCTIONS
No changes in your heart medications.    If you faint or nearly faint again, call us and we will place a monitor.  We do feel, however, that your symptoms were due to the Flomax.

## 2024-05-21 NOTE — PROGRESS NOTES
Subjective   Lashonda Bran is a 89 y.o. female.    Chief Complaint:  Follow-up coronary artery disease.  Fatigue.  Night sweats.  Near syncopal events.  An emergency room visit follow-up    HPI    She presented to the emergency room in early January 2024 with generalized weakness.  She was found to have a urinary tract infection.  She was felt to be mildly dehydrated.  She had an emergency room visit in March 2024 when she presented with multiple near syncopal episodes.  This occurred after she started Flomax.  The Flomax was discontinued.  She was started on this medication because of increased bladder residuals.  From a cardiac standpoint she has had rare episodes of midsternal chest discomfort.      She presented in 2017 with an acute myocardial infarction. At that time she had discomfort which began in the lower chest area and spread up through the chest and down into the arms with shortness of breath. She presented to the hospital where she was taken urgently to the cardiac catheterization laboratory for intervention. At that time she had a stent placed.     She denies a history of hypertension. Has a history of hyperlipidemia. No family history of premature coronary artery disease. Is on statin therapy. She did have a transient ischemic attack in 2016. At that time her work-up was negative.     Past medical history: Significant for interstitial lung disease. She has had a history of cholecystitis treated medically.     Past surgical history significant for appendectomy, hysterectomy, carpal tunnel, lumbar spine surgery, and some minor surgical procedures.     The patient is . Has two children and grandchildren and great-grandchildren in the area.      Allergies  Medication    · Aspirin TABS   Recorded By: Liliana Leal; 7/1/2016 10:52:49 AM   · codeine   Recorded By: Clifford Mccall; 5/29/2018 12:51:26 PM   · Darvocet A500   Recorded By: Liliana Leal; 7/1/2016 10:52:50 AM   · Penicillins    "Recorded By: Liliana Leal; 7/1/2016 10:52:49 AM   · Percocet TABS   Recorded By: Liliana Leal; 7/1/2016 10:52:49 AM   · sulfa   Recorded By: Liliana Leal; 7/1/2016 10:52:49 AM   · Vicodin TABS   Recorded By: Liliana Leal; 7/1/2016 10:52:50 AM     Family History  Mother    · Family history of cerebrovascular accident (CVA) (V17.1) (Z82.3)  Father    · Family history of diabetes mellitus (V18.0) (Z83.3)   · Family history of Heart problem  Brother    · Family history of malignant neoplasm (V16.9) (Z80.9)     Social History  Problems    · Lives alone with help available (V60.3) (Z60.2)   · Never smoker   · No alcohol use   ·  (V61.07) (Z63.4)     Review of Systems   Constitutional: Positive for malaise/fatigue.   Cardiovascular:  Positive for dyspnea on exertion.   Musculoskeletal:  Positive for arthritis.       Current Outpatient Medications   Medication Sig Dispense Refill    amLODIPine (Norvasc) 5 mg tablet Take 1 tablet (5 mg) by mouth once daily. 90 tablet 3    aspirin 81 mg EC tablet Take 1 tablet (81 mg) by mouth once daily.      metoprolol tartrate (Lopressor) 25 mg tablet Take 1 tablet (25 mg) by mouth 2 times a day. 180 tablet 3    simvastatin (Zocor) 10 mg tablet Take 1 tablet (10 mg) by mouth once daily at bedtime. 90 tablet 3     No current facility-administered medications for this visit.        Visit Vitals  /70 (BP Location: Left arm)   Pulse 83   Ht 1.499 m (4' 11\")   Wt 49.4 kg (109 lb)   SpO2 94%   BMI 22.02 kg/m²   OB Status Hysterectomy   Smoking Status Never   BSA 1.43 m²        Objective     Constitutional:       Appearance: Not in distress.   Neck:      Vascular: JVD normal.   Pulmonary:      Breath sounds: Normal breath sounds.   Cardiovascular:      Normal rate. Regular rhythm. Normal S1. Normal S2.       Murmurs: There is a grade 1/6 systolic murmur.      No gallop.    Pulses:     Intact distal pulses.   Edema:     Peripheral edema absent.   Abdominal:      " General: There is no distension.      Palpations: Abdomen is soft.   Neurological:      Mental Status: Alert.         Lab Review:   Lab Results   Component Value Date     03/08/2024    K 3.8 03/08/2024     03/08/2024    CO2 28 03/08/2024    BUN 14 03/08/2024    CREATININE 0.75 03/08/2024    GLUCOSE 118 (H) 03/08/2024    CALCIUM 9.2 03/08/2024     Lab Results   Component Value Date    CHOL 179 01/24/2024    TRIG 115 01/24/2024    HDL 73.9 01/24/2024       Assessment:    1.  Coronary disease.  EKG in March 2024 demonstrated sinus rhythm with minimal nonspecific ST-T changes.  Heart symptoms are stable.  Medical management given her advanced age.  She is quite functional and mentally sharp.  Should she present with an acute coronary syndrome, she may be a candidate for intervention.    2.  Hypertension.  Blood pressures are normal.    3.  Hyperlipidemia.  Latest lipid profile looks reasonably good cholesterol is 179, HDL 74, LDL 82.

## 2024-07-24 ENCOUNTER — APPOINTMENT (OUTPATIENT)
Dept: PRIMARY CARE | Facility: CLINIC | Age: 89
End: 2024-07-24
Payer: MEDICARE

## 2024-07-24 VITALS
SYSTOLIC BLOOD PRESSURE: 130 MMHG | DIASTOLIC BLOOD PRESSURE: 72 MMHG | BODY MASS INDEX: 22.22 KG/M2 | WEIGHT: 110 LBS | HEART RATE: 85 BPM | OXYGEN SATURATION: 95 %

## 2024-07-24 DIAGNOSIS — R73.9 HYPERGLYCEMIA: Primary | ICD-10-CM

## 2024-07-24 DIAGNOSIS — I10 ESSENTIAL HYPERTENSION: ICD-10-CM

## 2024-07-24 DIAGNOSIS — I25.10 CORONARY ARTERY DISEASE WITHOUT ANGINA PECTORIS, UNSPECIFIED VESSEL OR LESION TYPE, UNSPECIFIED WHETHER NATIVE OR TRANSPLANTED HEART: ICD-10-CM

## 2024-07-24 PROBLEM — R07.2 PRECORDIAL PAIN: Status: ACTIVE | Noted: 2023-04-21

## 2024-07-24 LAB — HBA1C MFR BLD: 6 % (ref 4.2–6.5)

## 2024-07-24 PROCEDURE — 99214 OFFICE O/P EST MOD 30 MIN: CPT | Performed by: FAMILY MEDICINE

## 2024-07-24 PROCEDURE — 1159F MED LIST DOCD IN RCRD: CPT | Performed by: FAMILY MEDICINE

## 2024-07-24 PROCEDURE — 3075F SYST BP GE 130 - 139MM HG: CPT | Performed by: FAMILY MEDICINE

## 2024-07-24 PROCEDURE — 3078F DIAST BP <80 MM HG: CPT | Performed by: FAMILY MEDICINE

## 2024-07-24 PROCEDURE — 1036F TOBACCO NON-USER: CPT | Performed by: FAMILY MEDICINE

## 2024-07-24 PROCEDURE — 1157F ADVNC CARE PLAN IN RCRD: CPT | Performed by: FAMILY MEDICINE

## 2024-07-24 PROCEDURE — 83036 HEMOGLOBIN GLYCOSYLATED A1C: CPT | Mod: CLIA WAIVED TEST | Performed by: FAMILY MEDICINE

## 2024-07-24 ASSESSMENT — ENCOUNTER SYMPTOMS: DEPRESSION: 0

## 2024-07-24 NOTE — PROGRESS NOTES
Subjective   Patient ID: Lashonda Bran is a 89 y.o. female who presents for Follow-up.    HPI   Patient here for follow-up.  She is generally doing well.  She has had a rough year so far and has she had some stressful situations.  She is still driving.  Urology-diagnosed with large postvoid residual volume.  Unfortunately she had a syncopal episode secondary to the Flomax.  She also had a very uncomfortable exam when she was at the office and does not want to return.  Son ill-had to have a leg amputation due to diabetes.  Sister .  Saw cardiology: Feels appointment did not go well.  She is feeling fine though with no chest pain or breathing problems.  Review of Systems    Objective   /72 (BP Location: Right arm, Patient Position: Sitting, BP Cuff Size: Adult)   Pulse 85   Wt 49.9 kg (110 lb)   SpO2 95%   BMI 22.22 kg/m²     Physical Exam  Alert, pleasant and in no acute distress.  Heart: Regular rate and rhythm without murmur  Lungs: Clear to auscultation  Lower extremities: No edema  Assessment/Plan   Problem List Items Addressed This Visit             ICD-10-CM    CAD (coronary artery disease) I25.10    Essential hypertension I10    Hyperglycemia - Primary R73.9    Relevant Orders    POCT Glycosylated Hemoglobin (HGB A1C) docked device   Patient here for follow-up.  Blood sugar shows good control with an A1c of 6.0.  Encouraged continued avoidance of sweets.  CAD/hypertension: Under good control.  Patient continues to be quite active for her age.  Follow-up 6 months

## 2024-09-16 ENCOUNTER — TELEPHONE (OUTPATIENT)
Dept: PRIMARY CARE | Facility: CLINIC | Age: 89
End: 2024-09-16
Payer: MEDICARE

## 2024-09-16 DIAGNOSIS — E78.5 DYSLIPIDEMIA: ICD-10-CM

## 2024-09-16 RX ORDER — SIMVASTATIN 10 MG/1
10 TABLET, FILM COATED ORAL NIGHTLY
Qty: 90 TABLET | Refills: 3 | Status: SHIPPED | OUTPATIENT
Start: 2024-09-16

## 2024-11-06 NOTE — TELEPHONE ENCOUNTER
"Patient states she did see urology but was \"blown off by the provider\" she states they told her to fuv with her PCP.   She had a bad experience and was not able to get her point across in regards to her med they prescribed her.   Today she is doing better but will call to schedule an appointment if the problem continues   " Orthosis    Diagnosis:   1. Fracture of distal phalanx of finger of right hand  Ambulatory Referral to Occupational Therapy        Indication: Tendon repair    Location: Right  long finger  Supplies: Custom Fit Orthotic  Orthosis type: Mallet/DIP Blocking  Wearing Schedule: Remove with Protected Technique Only as Needed and full time wear schedule 24/7 for the full 8 weeks per the MD instructions.   Describe Position: DIP in hyperextension     Precautions: Soft tissue repair, Tendon repair,, and Universal (skin contact/breakdown)    Patient or Caregiver expresses understanding of wearing Schedule and Precautions? Yes  Patient or Caregiver able to don/doff orthotic independently?Yes    Written orders provided to patient? Yes  Orders Obtained: Written  Orders Obtained from: Dr. Rothman       Return for evaluation and treatment No

## 2024-11-12 PROBLEM — E78.9 DISORDER OF LIPID METABOLISM: Status: RESOLVED | Noted: 2023-04-21 | Resolved: 2024-11-12

## 2024-11-13 ENCOUNTER — APPOINTMENT (OUTPATIENT)
Dept: CARDIOLOGY | Facility: CLINIC | Age: 89
End: 2024-11-13
Payer: MEDICARE

## 2024-11-13 VITALS
WEIGHT: 111 LBS | HEART RATE: 66 BPM | OXYGEN SATURATION: 99 % | HEIGHT: 59 IN | SYSTOLIC BLOOD PRESSURE: 130 MMHG | BODY MASS INDEX: 22.38 KG/M2 | DIASTOLIC BLOOD PRESSURE: 80 MMHG

## 2024-11-13 DIAGNOSIS — E78.2 MIXED HYPERLIPIDEMIA: ICD-10-CM

## 2024-11-13 DIAGNOSIS — I25.10 CORONARY ARTERY DISEASE WITHOUT ANGINA PECTORIS, UNSPECIFIED VESSEL OR LESION TYPE, UNSPECIFIED WHETHER NATIVE OR TRANSPLANTED HEART: ICD-10-CM

## 2024-11-13 DIAGNOSIS — I10 ESSENTIAL HYPERTENSION: ICD-10-CM

## 2024-11-13 DIAGNOSIS — Z95.5 PRESENCE OF STENT IN CORONARY ARTERY: Primary | ICD-10-CM

## 2024-11-13 DIAGNOSIS — I25.2 HISTORY OF MYOCARDIAL INFARCTION: ICD-10-CM

## 2024-11-13 PROBLEM — R07.2 PRECORDIAL PAIN: Status: RESOLVED | Noted: 2023-04-21 | Resolved: 2024-11-13

## 2024-11-13 PROCEDURE — 3075F SYST BP GE 130 - 139MM HG: CPT | Performed by: INTERNAL MEDICINE

## 2024-11-13 PROCEDURE — 3079F DIAST BP 80-89 MM HG: CPT | Performed by: INTERNAL MEDICINE

## 2024-11-13 PROCEDURE — 1159F MED LIST DOCD IN RCRD: CPT | Performed by: INTERNAL MEDICINE

## 2024-11-13 PROCEDURE — 99213 OFFICE O/P EST LOW 20 MIN: CPT | Performed by: INTERNAL MEDICINE

## 2024-11-13 PROCEDURE — 1036F TOBACCO NON-USER: CPT | Performed by: INTERNAL MEDICINE

## 2024-11-13 PROCEDURE — 1157F ADVNC CARE PLAN IN RCRD: CPT | Performed by: INTERNAL MEDICINE

## 2024-11-13 NOTE — PROGRESS NOTES
Subjective   Lashonda Bran is a 89 y.o. female.    Chief Complaint:  Follow-up coronary artery disease.    HPI    Over the past 6 months she is actually doing very well.  She continues to be independent.  Rare episodes of midsternal discomfort.  Has generalized symptoms of fatigue.  However she lives independently and is able to function at a fairly high level.    She presented in 2017 with an acute myocardial infarction. At that time she had discomfort which began in the lower chest area and spread up through the chest and down into the arms with shortness of breath. She presented to the hospital where she was taken urgently to the cardiac catheterization laboratory for intervention. At that time she had a stent placed.     She denies a history of hypertension. Has a history of hyperlipidemia. No family history of premature coronary artery disease. Is on statin therapy. She did have a transient ischemic attack in 2016. At that time her work-up was negative.     Past medical history: Significant for interstitial lung disease. She has had a history of cholecystitis treated medically.     Past surgical history significant for appendectomy, hysterectomy, carpal tunnel, lumbar spine surgery, and some minor surgical procedures.     The patient is . Has two children and grandchildren and great-grandchildren in the area.      Allergies  Medication    · Aspirin TABS   Recorded By: Liliana Leal; 7/1/2016 10:52:49 AM   · codeine   Recorded By: Clifford Mccall; 5/29/2018 12:51:26 PM   · Darvocet A500   Recorded By: Liliana Leal; 7/1/2016 10:52:50 AM   · Penicillins   Recorded By: Liliana Leal; 7/1/2016 10:52:49 AM   · Percocet TABS   Recorded By: Liliana Leal; 7/1/2016 10:52:49 AM   · sulfa   Recorded By: Liliana Leal; 7/1/2016 10:52:49 AM   · Vicodin TABS   Recorded By: Liliana Leal; 7/1/2016 10:52:50 AM     Family History  Mother    · Family history of cerebrovascular accident (CVA) (V17.1)  "(Z82.3)  Father    · Family history of diabetes mellitus (V18.0) (Z83.3)   · Family history of Heart problem  Brother    · Family history of malignant neoplasm (V16.9) (Z80.9)     Social History  Problems    · Lives alone with help available (V60.3) (Z60.2)   · Never smoker   · No alcohol use   ·  (V61.07) (Z63.4)     Review of Systems   Constitutional: Positive for malaise/fatigue.   Cardiovascular:  Positive for dyspnea on exertion.   Musculoskeletal:  Positive for arthritis.     Current Outpatient Medications   Medication Sig Dispense Refill    amLODIPine (Norvasc) 5 mg tablet Take 1 tablet (5 mg) by mouth once daily. 90 tablet 3    aspirin 81 mg EC tablet Take 1 tablet (81 mg) by mouth once daily.      metoprolol tartrate (Lopressor) 25 mg tablet Take 1 tablet (25 mg) by mouth 2 times a day. 180 tablet 3    simvastatin (Zocor) 10 mg tablet Take 1 tablet (10 mg) by mouth once daily at bedtime. 90 tablet 3     No current facility-administered medications for this visit.        Visit Vitals  /80 (BP Location: Left arm)   Pulse 66   Ht 1.499 m (4' 11\")   Wt 50.3 kg (111 lb)   SpO2 99%   BMI 22.42 kg/m²   OB Status Hysterectomy   Smoking Status Never   BSA 1.45 m²        Objective     Constitutional:       Appearance: Not in distress.   Neck:      Vascular: JVD normal.   Pulmonary:      Breath sounds: Normal breath sounds.   Cardiovascular:      Normal rate. Regular rhythm. Normal S1. Normal S2.       Murmurs: There is a grade 1/6 systolic murmur.      No gallop.    Pulses:     Intact distal pulses.   Edema:     Peripheral edema absent.   Abdominal:      General: There is no distension.      Palpations: Abdomen is soft.   Neurological:      Mental Status: Alert.         Lab Review:   Lab Results   Component Value Date     03/08/2024    K 3.8 03/08/2024     03/08/2024    CO2 28 03/08/2024    BUN 14 03/08/2024    CREATININE 0.75 03/08/2024    GLUCOSE 118 (H) 03/08/2024    CALCIUM 9.2 03/08/2024 "     Lab Results   Component Value Date    CHOL 179 01/24/2024    TRIG 115 01/24/2024    HDL 73.9 01/24/2024       Assessment:    1.  Coronary artery disease.  Status post angioplasty and stenting in 2017 after she presented with an acute myocardial infarction.  Continue conservative medical management.  Given her level of activity and normal mental status, I think she would be a candidate for acute intervention.    2.  Hypertension.  Blood pressures are reasonably well-controlled.    3.  Hyperlipidemia.  Latest LDL is 82.

## 2025-01-27 ENCOUNTER — APPOINTMENT (OUTPATIENT)
Dept: PRIMARY CARE | Facility: CLINIC | Age: OVER 89
End: 2025-01-27
Payer: MEDICARE

## 2025-02-21 ENCOUNTER — APPOINTMENT (OUTPATIENT)
Dept: PRIMARY CARE | Facility: CLINIC | Age: OVER 89
End: 2025-02-21
Payer: MEDICARE

## 2025-04-08 DIAGNOSIS — I10 ESSENTIAL HYPERTENSION: ICD-10-CM

## 2025-04-08 RX ORDER — AMLODIPINE BESYLATE 5 MG/1
5 TABLET ORAL DAILY
Qty: 90 TABLET | Refills: 3 | Status: SHIPPED | OUTPATIENT
Start: 2025-04-08

## 2025-04-08 RX ORDER — METOPROLOL TARTRATE 25 MG/1
25 TABLET, FILM COATED ORAL 2 TIMES DAILY
Qty: 180 TABLET | Refills: 3 | Status: SHIPPED | OUTPATIENT
Start: 2025-04-08

## 2025-04-09 ENCOUNTER — APPOINTMENT (OUTPATIENT)
Dept: PRIMARY CARE | Facility: CLINIC | Age: OVER 89
End: 2025-04-09
Payer: MEDICARE

## 2025-04-09 VITALS
BODY MASS INDEX: 21.65 KG/M2 | HEART RATE: 74 BPM | HEIGHT: 59 IN | DIASTOLIC BLOOD PRESSURE: 76 MMHG | OXYGEN SATURATION: 95 % | TEMPERATURE: 96.7 F | WEIGHT: 107.4 LBS | SYSTOLIC BLOOD PRESSURE: 138 MMHG

## 2025-04-09 DIAGNOSIS — I25.2 HISTORY OF MYOCARDIAL INFARCTION: ICD-10-CM

## 2025-04-09 DIAGNOSIS — H61.23 IMPACTED CERUMEN OF BOTH EARS: ICD-10-CM

## 2025-04-09 DIAGNOSIS — I10 ESSENTIAL HYPERTENSION: Primary | ICD-10-CM

## 2025-04-09 DIAGNOSIS — E55.9 VITAMIN D DEFICIENCY: ICD-10-CM

## 2025-04-09 DIAGNOSIS — I25.10 CORONARY ARTERY DISEASE WITHOUT ANGINA PECTORIS, UNSPECIFIED VESSEL OR LESION TYPE, UNSPECIFIED WHETHER NATIVE OR TRANSPLANTED HEART: ICD-10-CM

## 2025-04-09 DIAGNOSIS — R73.03 PREDIABETES: ICD-10-CM

## 2025-04-09 PROCEDURE — 3078F DIAST BP <80 MM HG: CPT | Performed by: INTERNAL MEDICINE

## 2025-04-09 PROCEDURE — 1160F RVW MEDS BY RX/DR IN RCRD: CPT | Performed by: INTERNAL MEDICINE

## 2025-04-09 PROCEDURE — 1157F ADVNC CARE PLAN IN RCRD: CPT | Performed by: INTERNAL MEDICINE

## 2025-04-09 PROCEDURE — 1126F AMNT PAIN NOTED NONE PRSNT: CPT | Performed by: INTERNAL MEDICINE

## 2025-04-09 PROCEDURE — 99213 OFFICE O/P EST LOW 20 MIN: CPT | Performed by: INTERNAL MEDICINE

## 2025-04-09 PROCEDURE — 1159F MED LIST DOCD IN RCRD: CPT | Performed by: INTERNAL MEDICINE

## 2025-04-09 PROCEDURE — 1036F TOBACCO NON-USER: CPT | Performed by: INTERNAL MEDICINE

## 2025-04-09 PROCEDURE — 3075F SYST BP GE 130 - 139MM HG: CPT | Performed by: INTERNAL MEDICINE

## 2025-04-09 ASSESSMENT — ENCOUNTER SYMPTOMS
TREMORS: 0
APPETITE CHANGE: 0
ABDOMINAL PAIN: 0
CONFUSION: 0
FEVER: 0
DIARRHEA: 0
BLOOD IN STOOL: 0
WEAKNESS: 0
SORE THROAT: 0
WHEEZING: 0
CHILLS: 0
DYSURIA: 0
HEMATURIA: 0
VOMITING: 0
HEADACHES: 0
SLEEP DISTURBANCE: 0
FREQUENCY: 0
JOINT SWELLING: 0
UNEXPECTED WEIGHT CHANGE: 0
SEIZURES: 0
CONSTIPATION: 0
NERVOUS/ANXIOUS: 0
WOUND: 0
DIZZINESS: 0
EYE PAIN: 0
NUMBNESS: 0
FLANK PAIN: 0
BACK PAIN: 0
PALPITATIONS: 0
TROUBLE SWALLOWING: 0
COUGH: 0
SHORTNESS OF BREATH: 0
NAUSEA: 0
EYE DISCHARGE: 0

## 2025-04-09 ASSESSMENT — PATIENT HEALTH QUESTIONNAIRE - PHQ9
2. FEELING DOWN, DEPRESSED OR HOPELESS: NOT AT ALL
SUM OF ALL RESPONSES TO PHQ9 QUESTIONS 1 AND 2: 0
1. LITTLE INTEREST OR PLEASURE IN DOING THINGS: NOT AT ALL

## 2025-04-09 ASSESSMENT — PAIN SCALES - GENERAL: PAINLEVEL_OUTOF10: 0-NO PAIN

## 2025-04-09 NOTE — PATIENT INSTRUCTIONS
Please use over the counter wax removal drops 3-4 drops in each ear for 4-5 days and then use bulb syringe for removal per  instructions.  May use drops 4-5 days each month to prevent build up of wax .

## 2025-04-09 NOTE — ASSESSMENT & PLAN NOTE
- chronic, BP controlled on Ca blocker and  short acting BB  - h/o TIA 2016  Orders:    CBC and Auto Differential; Future    Comprehensive Metabolic Panel; Future

## 2025-04-09 NOTE — ASSESSMENT & PLAN NOTE
- taking daily vit D unknown dose   Orders:    Vitamin D 25-Hydroxy,Total (for eval of Vitamin D levels); Future

## 2025-04-09 NOTE — PROGRESS NOTES
"Subjective   Patient ID: Lashonda Bran is a 89 y.o. female who presents for New pt est care .    HPI   NEW PT (former PCP [facility])  Here to establish .  Chart reviewed, PMHX, meds, Social HX- updated at visit   Labs (March 2024 -normal TSH, magnesium, CHEM 14, CBC) and imaging reviewed.    Recent recent ER visits or hospitalizations:  NO    Specialists:  Cardiology-Dr. Killian-CAD/stent  Podiatry-history of ingrown nail, foot ulcer    Noted 11 medication allergies listed!    Lives by herself    Review of Systems   Constitutional:  Negative for appetite change, chills, fever and unexpected weight change.   HENT:  Positive for hearing loss. Negative for congestion, ear pain, sneezing, sore throat and trouble swallowing.    Eyes:  Negative for pain, discharge and visual disturbance.   Respiratory:  Negative for cough, shortness of breath and wheezing.    Cardiovascular:  Negative for chest pain, palpitations and leg swelling.   Gastrointestinal:  Negative for abdominal pain, blood in stool, constipation, diarrhea, nausea and vomiting.   Genitourinary:  Negative for dysuria, flank pain, frequency, hematuria and urgency.   Musculoskeletal:  Negative for back pain, gait problem and joint swelling.   Skin:  Negative for rash and wound.   Neurological:  Negative for dizziness, tremors, seizures, syncope, weakness, numbness and headaches.   Psychiatric/Behavioral:  Negative for confusion, sleep disturbance and suicidal ideas. The patient is not nervous/anxious.        Objective   /76   Pulse 74   Temp 35.9 °C (96.7 °F)   Ht 1.499 m (4' 11\")   Wt 48.7 kg (107 lb 6.4 oz)   SpO2 95%   BMI 21.69 kg/m²   Patient Health Questionnaire-2 Score: 0      Physical Exam  Vitals and nursing note reviewed.   Constitutional:       General: She is not in acute distress.     Appearance: Normal appearance.   HENT:      Head: Normocephalic and atraumatic.      Right Ear: There is impacted cerumen.      Left Ear: There is impacted " cerumen.      Nose: Nose normal.      Mouth/Throat:      Mouth: Mucous membranes are moist.      Pharynx: Oropharynx is clear.   Eyes:      Extraocular Movements: Extraocular movements intact.      Conjunctiva/sclera: Conjunctivae normal.      Pupils: Pupils are equal, round, and reactive to light.   Cardiovascular:      Rate and Rhythm: Normal rate and regular rhythm.      Heart sounds: No murmur heard.  Pulmonary:      Effort: Pulmonary effort is normal. No respiratory distress.      Breath sounds: Normal breath sounds. No wheezing or rhonchi.   Abdominal:      General: Bowel sounds are normal.      Palpations: Abdomen is soft.      Tenderness: There is no abdominal tenderness.   Musculoskeletal:         General: Normal range of motion.      Cervical back: Neck supple.      Thoracic back: Scoliosis (Kyphosis) present.      Right lower leg: No edema.      Left lower leg: No edema.   Skin:     General: Skin is warm and dry.      Findings: No bruising or rash.   Neurological:      General: No focal deficit present.      Mental Status: She is alert and oriented to person, place, and time.      Motor: No weakness.      Gait: Gait normal.   Psychiatric:         Mood and Affect: Mood normal.         Behavior: Behavior normal.         Assessment/Plan   Assessment & Plan  Essential hypertension  - chronic, BP controlled on Ca blocker and  short acting BB  - h/o TIA 2016  Orders:    CBC and Auto Differential; Future    Comprehensive Metabolic Panel; Future    Coronary artery disease without angina pectoris, unspecified vessel or lesion type, unspecified whether native or transplanted heart  - stable, no CP  - s/p stent   - pt would like to continue SIMVA 10 mg , advised of interaction with Norvasc ( does not like to change any  )  Orders:    Lipid Panel; Future    History of myocardial infarction  -Continue both statin and baby aspirin-per patient choice       Prediabetes  - chronic A1c 6.o july 2024  - pt not aware of   this DX    Orders:    Hemoglobin A1C; Future    Vitamin D deficiency  - taking daily vit D unknown dose   Orders:    Vitamin D 25-Hydroxy,Total (for eval of Vitamin D levels); Future    Impacted cerumen of both ears  - pt requesting ENT referral   -Encouraged use of OTC Debrox  Orders:    Referral to ENT; Future    RTC 6 months and PRN

## 2025-04-09 NOTE — ASSESSMENT & PLAN NOTE
- stable, no CP  - s/p stent   - pt would like to continue SIMVA 10 mg , advised of interaction with Norvasc ( does not like to change any  )  Orders:    Lipid Panel; Future

## 2025-04-10 ENCOUNTER — TELEPHONE (OUTPATIENT)
Dept: PRIMARY CARE | Facility: CLINIC | Age: OVER 89
End: 2025-04-10
Payer: MEDICARE

## 2025-04-10 LAB
25(OH)D3+25(OH)D2 SERPL-MCNC: 70 NG/ML (ref 30–100)
ALBUMIN SERPL-MCNC: 4.5 G/DL (ref 3.6–5.1)
ALP SERPL-CCNC: 77 U/L (ref 37–153)
ALT SERPL-CCNC: 11 U/L (ref 6–29)
ANION GAP SERPL CALCULATED.4IONS-SCNC: 11 MMOL/L (CALC) (ref 7–17)
AST SERPL-CCNC: 17 U/L (ref 10–35)
BASOPHILS # BLD AUTO: 20 CELLS/UL (ref 0–200)
BASOPHILS NFR BLD AUTO: 0.3 %
BILIRUB SERPL-MCNC: 0.6 MG/DL (ref 0.2–1.2)
BUN SERPL-MCNC: 20 MG/DL (ref 7–25)
CALCIUM SERPL-MCNC: 9.7 MG/DL (ref 8.6–10.4)
CHLORIDE SERPL-SCNC: 101 MMOL/L (ref 98–110)
CHOLEST SERPL-MCNC: 171 MG/DL
CHOLEST/HDLC SERPL: 2.2 (CALC)
CO2 SERPL-SCNC: 26 MMOL/L (ref 20–32)
CREAT SERPL-MCNC: 0.73 MG/DL (ref 0.6–0.95)
EGFRCR SERPLBLD CKD-EPI 2021: 75 ML/MIN/1.73M2
EOSINOPHIL # BLD AUTO: 27 CELLS/UL (ref 15–500)
EOSINOPHIL NFR BLD AUTO: 0.4 %
ERYTHROCYTE [DISTWIDTH] IN BLOOD BY AUTOMATED COUNT: 13.2 % (ref 11–15)
EST. AVERAGE GLUCOSE BLD GHB EST-MCNC: 126 MG/DL
EST. AVERAGE GLUCOSE BLD GHB EST-SCNC: 7 MMOL/L
GLUCOSE SERPL-MCNC: 110 MG/DL (ref 65–99)
HBA1C MFR BLD: 6 % OF TOTAL HGB
HCT VFR BLD AUTO: 39.9 % (ref 35–45)
HDLC SERPL-MCNC: 79 MG/DL
HGB BLD-MCNC: 13.5 G/DL (ref 11.7–15.5)
LDLC SERPL CALC-MCNC: 72 MG/DL (CALC)
LYMPHOCYTES # BLD AUTO: 1735 CELLS/UL (ref 850–3900)
LYMPHOCYTES NFR BLD AUTO: 25.9 %
MCH RBC QN AUTO: 31.8 PG (ref 27–33)
MCHC RBC AUTO-ENTMCNC: 33.8 G/DL (ref 32–36)
MCV RBC AUTO: 93.9 FL (ref 80–100)
MONOCYTES # BLD AUTO: 616 CELLS/UL (ref 200–950)
MONOCYTES NFR BLD AUTO: 9.2 %
NEUTROPHILS # BLD AUTO: 4301 CELLS/UL (ref 1500–7800)
NEUTROPHILS NFR BLD AUTO: 64.2 %
NONHDLC SERPL-MCNC: 92 MG/DL (CALC)
PLATELET # BLD AUTO: 176 THOUSAND/UL (ref 140–400)
PMV BLD REES-ECKER: 11.6 FL (ref 7.5–12.5)
POTASSIUM SERPL-SCNC: 4.4 MMOL/L (ref 3.5–5.3)
PROT SERPL-MCNC: 7.3 G/DL (ref 6.1–8.1)
RBC # BLD AUTO: 4.25 MILLION/UL (ref 3.8–5.1)
SODIUM SERPL-SCNC: 138 MMOL/L (ref 135–146)
TRIGL SERPL-MCNC: 123 MG/DL
WBC # BLD AUTO: 6.7 THOUSAND/UL (ref 3.8–10.8)

## 2025-04-30 ENCOUNTER — OFFICE VISIT (OUTPATIENT)
Dept: OTOLARYNGOLOGY | Facility: CLINIC | Age: OVER 89
End: 2025-04-30
Payer: MEDICARE

## 2025-04-30 ENCOUNTER — CLINICAL SUPPORT (OUTPATIENT)
Dept: AUDIOLOGY | Facility: CLINIC | Age: OVER 89
End: 2025-04-30
Payer: MEDICARE

## 2025-04-30 VITALS
BODY MASS INDEX: 21.57 KG/M2 | HEART RATE: 72 BPM | TEMPERATURE: 97.4 F | WEIGHT: 107 LBS | SYSTOLIC BLOOD PRESSURE: 153 MMHG | HEIGHT: 59 IN | DIASTOLIC BLOOD PRESSURE: 78 MMHG

## 2025-04-30 DIAGNOSIS — H90.6 MIXED HEARING LOSS, BILATERAL: Primary | ICD-10-CM

## 2025-04-30 DIAGNOSIS — H91.93 BILATERAL HEARING LOSS, UNSPECIFIED HEARING LOSS TYPE: Primary | ICD-10-CM

## 2025-04-30 DIAGNOSIS — H61.23 IMPACTED CERUMEN OF BOTH EARS: ICD-10-CM

## 2025-04-30 PROCEDURE — 3078F DIAST BP <80 MM HG: CPT | Performed by: NURSE PRACTITIONER

## 2025-04-30 PROCEDURE — 99203 OFFICE O/P NEW LOW 30 MIN: CPT | Performed by: NURSE PRACTITIONER

## 2025-04-30 PROCEDURE — 1126F AMNT PAIN NOTED NONE PRSNT: CPT | Performed by: NURSE PRACTITIONER

## 2025-04-30 PROCEDURE — 1159F MED LIST DOCD IN RCRD: CPT | Performed by: NURSE PRACTITIONER

## 2025-04-30 PROCEDURE — 69210 REMOVE IMPACTED EAR WAX UNI: CPT | Mod: 50 | Performed by: NURSE PRACTITIONER

## 2025-04-30 PROCEDURE — 69210 REMOVE IMPACTED EAR WAX UNI: CPT | Performed by: NURSE PRACTITIONER

## 2025-04-30 PROCEDURE — 1036F TOBACCO NON-USER: CPT | Performed by: NURSE PRACTITIONER

## 2025-04-30 PROCEDURE — 1157F ADVNC CARE PLAN IN RCRD: CPT | Performed by: NURSE PRACTITIONER

## 2025-04-30 PROCEDURE — 99213 OFFICE O/P EST LOW 20 MIN: CPT | Mod: 25 | Performed by: NURSE PRACTITIONER

## 2025-04-30 PROCEDURE — 3077F SYST BP >= 140 MM HG: CPT | Performed by: NURSE PRACTITIONER

## 2025-04-30 PROCEDURE — 92557 COMPREHENSIVE HEARING TEST: CPT | Performed by: AUDIOLOGIST

## 2025-04-30 PROCEDURE — 92567 TYMPANOMETRY: CPT | Performed by: AUDIOLOGIST

## 2025-04-30 SDOH — ECONOMIC STABILITY: FOOD INSECURITY: WITHIN THE PAST 12 MONTHS, YOU WORRIED THAT YOUR FOOD WOULD RUN OUT BEFORE YOU GOT MONEY TO BUY MORE.: NEVER TRUE

## 2025-04-30 SDOH — ECONOMIC STABILITY: FOOD INSECURITY: WITHIN THE PAST 12 MONTHS, THE FOOD YOU BOUGHT JUST DIDN'T LAST AND YOU DIDN'T HAVE MONEY TO GET MORE.: NEVER TRUE

## 2025-04-30 ASSESSMENT — LIFESTYLE VARIABLES
HOW OFTEN DO YOU HAVE SIX OR MORE DRINKS ON ONE OCCASION: NEVER
HOW OFTEN DO YOU HAVE A DRINK CONTAINING ALCOHOL: NEVER
HOW MANY STANDARD DRINKS CONTAINING ALCOHOL DO YOU HAVE ON A TYPICAL DAY: PATIENT DOES NOT DRINK
AUDIT-C TOTAL SCORE: 0
SKIP TO QUESTIONS 9-10: 1

## 2025-04-30 ASSESSMENT — ENCOUNTER SYMPTOMS
DEPRESSION: 0
OCCASIONAL FEELINGS OF UNSTEADINESS: 0
LOSS OF SENSATION IN FEET: 0

## 2025-04-30 ASSESSMENT — COLUMBIA-SUICIDE SEVERITY RATING SCALE - C-SSRS
2. HAVE YOU ACTUALLY HAD ANY THOUGHTS OF KILLING YOURSELF?: NO
6. HAVE YOU EVER DONE ANYTHING, STARTED TO DO ANYTHING, OR PREPARED TO DO ANYTHING TO END YOUR LIFE?: NO
1. IN THE PAST MONTH, HAVE YOU WISHED YOU WERE DEAD OR WISHED YOU COULD GO TO SLEEP AND NOT WAKE UP?: NO

## 2025-04-30 ASSESSMENT — PAIN SCALES - GENERAL: PAINLEVEL_OUTOF10: 0-NO PAIN

## 2025-04-30 NOTE — PROGRESS NOTES
"AUDIOLOGY ADULT AUDIOMETRIC EVALUATION      Name:  Lashonda Bran  :  9/3/1927  Age:  89 y.o.  Date of Evaluation:  2025    HISTORY  Reason for visit:  hearing loss  Ms. Bran is seen 2025 at the request of Kelsey Crump CNP  for an evaluation of hearing.      Chief complaint:    Hearing loss    Hearing loss:  has not noticed any asymmetry   Tinnitus:   denies  Otitis Media: denies  Otologic surgical history:  denies  Dizziness/imbalance:  imbalance  Otalgia:  denies  Ear pressure/fullness:  denies  History of excessive noise exposure:  denies  Other: none    Hearing aid history: none          EVALUATION  Please find audiogram in \"Media\" tab (Document Type:  Audiology Report) or included at the bottom of this note.    RESULTS   Otoscopic Evaluation: clear canals bilaterally; left canal redness     Immittance Measures (226 Hz probe tone):   Tympanometry is consistent with normal middle ear pressure and normal tympanic membrane mobility bilaterally.       Test technique:  standard behavioral technique via TDH earphones (checked with insert earphones).  Reliability is good.    Pure Tone Audiometry:  Hearing sensitivity is in the mild to severe hearing loss range bilaterally.        Note bilateral air-bone gaps for 4000 Hz    Speech Audiometry:        Right Ear:  Speech Reception Threshold (SRT) was obtained at 45 dBHL                 Speech discrimination score was 84% in quiet when words were presented at 85 dBHL      Left Ear:  Speech Reception Threshold (SRT) was obtained at 45 dBHL                 Speech discrimination score was 88% in quiet when words were presented at 85 dBHL    IMPRESSIONS:  Patient is expected to experience communication difficulty in all listening situations.  Patient is expected to benefit from devices that provide amplification (e.g., hearing aids) and improve the desired sound signal over that of background noise as well as from effective communication strategies.  "     RECOMMENDATIONS  Continue with medical follow-up with FREDDY Mota, TRAVIS.  Hearing Aid Evaluation with an audiologist to discuss hearing technology (such as hearing aids) and services.   Reassess hearing in 1 year (or sooner if medically indicated or if there is a concern for a change in hearing).    Continue with medical follow-up as indicated.       PATIENT EDUCATION  Discussed results and recommendations with patient.  Questions were addressed and the patient was encouraged to contact our department should concerns arise.       NIKOLAS Mike, CCC-A  Licensed Audiologist

## 2025-04-30 NOTE — PROGRESS NOTES
Subjective   Patient ID: Lashonda Bran is a 89 y.o. female who presents for ear cleaning    HPI  Patient here for hearing loss. This has been bothering her for awhile. It is bilateral. Denies tinnitus. She does get some right ear pain, not recently. Denies ear drainage. Denies vertigo.     Denies previous ear surgery, loud noise exposure, and family history of hearing loss.      I reviewed patient's past medical and surgical history.  Problem List[1]  Surgical History[2]    Review of Systems    All other systems have been reviewed and are negative for complaints except for those mentioned in history of present illness, past medical history and problem list.    Objective   Physical Exam    Constitutional: No fever, chills, weight loss or weight gain  General appearance: Appears well, well-nourished, well groomed. No acute distress.    Communication: Normal communication    Psychiatric: Oriented to person, place and time. Normal mood and affect.    Neurologic: Cranial nerves II-XII grossly intact and symmetric bilaterally.    Head and Face:  Head: Atraumatic with no masses, lesions or scarring.  Face: Normal symmetry. No scars or deformities.  TMJ: Normal, no trismus.    Eyes: Conjunctiva not edematous or erythematous.     Right Ear: External inspection of ear with no deformity, scars, or masses. EAC is impacted with cerumen, TM not visible.     Left Ear: External inspection of ear with no deformity, scars, or masses. EAC is impacted with cerumen, TM not visible.     Nose: External inspection of nose: No nasal lesions, lacerations or scars. Anterior rhinoscopy with limited visualization past the inferior turbinates. No tenderness on frontal or maxillary sinus palpation.    Oral Cavity/Mouth: Oral cavity and oropharynx mucosa moist and pink. No lesions or masses. Tonsils appear normal. Uvula is midline. Tongue with no masses or lesions. Tongue with good mobility. The oropharynx is clear.    Neck: Normal appearing,  symmetric, trachea midline.     Cardiovascular: Examination of peripheral vascular system shows no clubbing or cyanosis.    Respiratory: No respiratory distress increased work of breathing. Inspection of the chest with symmetric chest expansion and normal respiratory effort.    Skin: No head and neck rashes.    Lymph nodes: No adenopathy.    Procedure: Cerumen Removal  Indication: Cerumen Impaction  Risks, benefits, alternatives, and expectations discussed with patient and patient wishes to proceed.    Bilateral canals with cerumen impaction.  Using the microscope, suction, and alligator forceps, large amounts of soft brown cerumen removed bilaterally. Sterile water applied to the left ear to help soften the cerumen. Mild blood blisters in the canal after cleaning. Both TMs intact. No effusions or retractions noted.  Patient tolerated procedure well.     Assessment/Plan   Diagnoses and all orders for this visit:  Bilateral hearing loss, unspecified hearing loss type  Impacted cerumen of both ears    Ears were successfully cleaned. Patient notes improvement after the cleaning. She does have an audiogram after our visit today.  Recommend repeat ear cleaning every 6-12 months.    All questions answered to patient satisfaction.        FREDDY Lennon-CNP 04/30/25 1:23 PM        [1]   Patient Active Problem List  Diagnosis    Abdominal bloating    Abdominal pain    Ataxia    Bilateral upper abdominal pain    CAD (coronary artery disease)    Abnormal gait    Goiter    Interstitial lung disease (Multi)    Paresthesia    Presence of stent in coronary artery    Laceration of scalp    Thyroid nodule    Transient ischemic attack    Vitamin D deficiency    Cholelithiasis    Symptomatic cholelithiasis    Atrophic rhinitis    Dog bite    Epistaxis    Urinary hesitancy    Vision changes    Hyperlipidemia    Essential hypertension    Dehydration    Fatigue    History of myocardial infarction    History of transient ischemic  attack    Hyperglycemia    Skin lesion    Upper respiratory tract infection   [2]   Past Surgical History:  Procedure Laterality Date    APPENDECTOMY  02/18/2022    Appendectomy    BACK SURGERY  07/01/2016    Back Surgery    CATARACT EXTRACTION  05/29/2018    Cataract Extraction    HEMORRHOID SURGERY  07/01/2016    Hemorrhoidectomy    HYSTERECTOMY  02/18/2022    Hysterectomy    MR HEAD ANGIO WO IV CONTRAST  2/27/2023    MR HEAD ANGIO WO IV CONTRAST PAR MRI    MR NECK ANGIO WO IV CONTRAST  2/27/2023    MR NECK ANGIO WO IV CONTRAST PAR MRI    OTHER SURGICAL HISTORY  02/16/2022    Carpal tunnel surgery    TONSILLECTOMY  07/01/2016    Tonsillectomy

## 2025-05-06 PROBLEM — J31.0 ATROPHIC RHINITIS: Status: RESOLVED | Noted: 2023-04-21 | Resolved: 2025-05-06

## 2025-05-06 PROBLEM — J06.9 UPPER RESPIRATORY TRACT INFECTION: Status: RESOLVED | Noted: 2024-03-13 | Resolved: 2025-05-06

## 2025-05-07 ENCOUNTER — OFFICE VISIT (OUTPATIENT)
Dept: CARDIOLOGY | Facility: CLINIC | Age: OVER 89
End: 2025-05-07
Payer: MEDICARE

## 2025-05-07 VITALS
HEART RATE: 80 BPM | WEIGHT: 108 LBS | SYSTOLIC BLOOD PRESSURE: 128 MMHG | HEIGHT: 59 IN | OXYGEN SATURATION: 98 % | BODY MASS INDEX: 21.77 KG/M2 | DIASTOLIC BLOOD PRESSURE: 76 MMHG

## 2025-05-07 DIAGNOSIS — Z95.5 PRESENCE OF STENT IN CORONARY ARTERY: ICD-10-CM

## 2025-05-07 DIAGNOSIS — E78.2 MIXED HYPERLIPIDEMIA: ICD-10-CM

## 2025-05-07 DIAGNOSIS — I25.10 CORONARY ARTERY DISEASE WITHOUT ANGINA PECTORIS, UNSPECIFIED VESSEL OR LESION TYPE, UNSPECIFIED WHETHER NATIVE OR TRANSPLANTED HEART: ICD-10-CM

## 2025-05-07 DIAGNOSIS — I10 ESSENTIAL HYPERTENSION: Primary | ICD-10-CM

## 2025-05-07 DIAGNOSIS — I25.2 HISTORY OF MYOCARDIAL INFARCTION: ICD-10-CM

## 2025-05-07 DIAGNOSIS — I10 ESSENTIAL HYPERTENSION: ICD-10-CM

## 2025-05-07 LAB
ATRIAL RATE: 71 BPM
P AXIS: 93 DEGREES
P OFFSET: 173 MS
P ONSET: 138 MS
PR INTERVAL: 176 MS
Q ONSET: 226 MS
QRS COUNT: 12 BEATS
QRS DURATION: 60 MS
QT INTERVAL: 400 MS
QTC CALCULATION(BAZETT): 434 MS
QTC FREDERICIA: 423 MS
R AXIS: -4 DEGREES
T AXIS: -11 DEGREES
T OFFSET: 426 MS
VENTRICULAR RATE: 71 BPM

## 2025-05-07 PROCEDURE — 99212 OFFICE O/P EST SF 10 MIN: CPT | Performed by: INTERNAL MEDICINE

## 2025-05-07 PROCEDURE — 3078F DIAST BP <80 MM HG: CPT | Performed by: INTERNAL MEDICINE

## 2025-05-07 PROCEDURE — 1159F MED LIST DOCD IN RCRD: CPT | Performed by: INTERNAL MEDICINE

## 2025-05-07 PROCEDURE — 93005 ELECTROCARDIOGRAM TRACING: CPT | Performed by: INTERNAL MEDICINE

## 2025-05-07 PROCEDURE — 3074F SYST BP LT 130 MM HG: CPT | Performed by: INTERNAL MEDICINE

## 2025-05-07 PROCEDURE — 1036F TOBACCO NON-USER: CPT | Performed by: INTERNAL MEDICINE

## 2025-05-07 PROCEDURE — 99213 OFFICE O/P EST LOW 20 MIN: CPT | Performed by: INTERNAL MEDICINE

## 2025-05-07 RX ORDER — AMLODIPINE BESYLATE 5 MG/1
5 TABLET ORAL DAILY
Qty: 90 TABLET | Refills: 3 | Status: SHIPPED | OUTPATIENT
Start: 2025-05-07

## 2025-05-07 RX ORDER — METOPROLOL TARTRATE 25 MG/1
25 TABLET, FILM COATED ORAL 2 TIMES DAILY
Qty: 180 TABLET | Refills: 3 | Status: SHIPPED | OUTPATIENT
Start: 2025-05-07

## 2025-05-07 NOTE — PATIENT INSTRUCTIONS
No changes in your medications.    Your heart exam is normal.  Your blood tests look good.  Your blood pressure is good.

## 2025-05-07 NOTE — PROGRESS NOTES
Subjective   Lashonda Bran is a 89 y.o. female.    Chief Complaint:  Follow-up coronary artery disease.    HPI    She continues to be independent.  She has her own place.  She drives and drove to the office today.  Has not had any anginal symptoms.  When she presented in 2017, she had very classic symptoms of angina with pressure and heaviness in the chest.  Also had shortness of breath.  The symptoms have not reoccurred.  Otherwise has done well with no major medical problems.    She presented in 2017 with an acute myocardial infarction. At that time she had discomfort which began in the lower chest area and spread up through the chest and down into the arms with shortness of breath. She presented to the hospital where she was taken urgently to the cardiac catheterization laboratory for intervention. At that time she had a stent placed.     She denies a history of hypertension. Has a history of hyperlipidemia. No family history of premature coronary artery disease. Is on statin therapy. She did have a transient ischemic attack in 2016. At that time her work-up was negative.     Past medical history: Significant for interstitial lung disease. She has had a history of cholecystitis treated medically.     Past surgical history significant for appendectomy, hysterectomy, carpal tunnel, lumbar spine surgery, and some minor surgical procedures.     The patient is . Has two children and grandchildren and great-grandchildren in the area.      Allergies  Medication    · Aspirin TABS   · codeine   · Penicillins   · Percocet TABS   · sulfa   · Vicodin TABS     Family History  Mother    · Family history of cerebrovascular accident (CVA) (V17.1) (Z82.3)  Father    · Family history of diabetes mellitus (V18.0) (Z83.3)   · Family history of Heart problem  Brother    · Family history of malignant neoplasm (V16.9) (Z80.9)     Social History  Problems    · Lives alone with help available (V60.3) (Z60.2)   · Never smoker   ·  "No alcohol use   ·  (V61.07) (Z63.4)     Review of Systems   Constitutional: Positive for malaise/fatigue.   Cardiovascular:  Positive for dyspnea on exertion.   Musculoskeletal:  Positive for arthritis.     Current Medications[1]     Visit Vitals  /76 (BP Location: Right arm)   Pulse 80   Ht 1.499 m (4' 11\")   Wt 49 kg (108 lb)   SpO2 98%   BMI 21.81 kg/m²   OB Status Hysterectomy   Smoking Status Never   BSA 1.43 m²        Objective     Constitutional:       Appearance: Not in distress.   Neck:      Vascular: JVD normal.   Pulmonary:      Breath sounds: Normal breath sounds.   Cardiovascular:      Normal rate. Regular rhythm. Normal S1. Normal S2.       Murmurs: There is a grade 1/6 systolic murmur.      No gallop.    Pulses:     Intact distal pulses.   Edema:     Peripheral edema absent.   Abdominal:      General: There is no distension.      Palpations: Abdomen is soft.   Neurological:      Mental Status: Alert.         Lab Review:   Lab Results   Component Value Date     04/09/2025    K 4.4 04/09/2025     04/09/2025    CO2 26 04/09/2025    BUN 20 04/09/2025    CREATININE 0.73 04/09/2025    GLUCOSE 110 (H) 04/09/2025    CALCIUM 9.7 04/09/2025     Lab Results   Component Value Date    CHOL 171 04/09/2025    TRIG 123 04/09/2025    HDL 79 04/09/2025       Assessment:    1.  Coronary artery disease.  Continue medical management.  No anginal symptoms.  Given her level of function and normal mental status, she would be a candidate for acute intervention should the need arise.    2.  Hypertension.  Blood pressures are well-controlled.    3.  Hyperlipidemia.  Cholesterol 171, HDL 79, LDL 72.         [1]   Current Outpatient Medications   Medication Sig Dispense Refill    amLODIPine (Norvasc) 5 mg tablet TAKE ONE TABLET BY MOUTH EVERY DAY 90 tablet 3    aspirin 81 mg EC tablet Take 1 tablet (81 mg) by mouth once daily.      metoprolol tartrate (Lopressor) 25 mg tablet TAKE ONE TABLET BY MOUTH " TWICE A  tablet 3    simvastatin (Zocor) 10 mg tablet Take 1 tablet (10 mg) by mouth once daily at bedtime. 90 tablet 3     No current facility-administered medications for this visit.

## 2025-06-06 ENCOUNTER — TELEPHONE (OUTPATIENT)
Dept: PRIMARY CARE | Facility: CLINIC | Age: OVER 89
End: 2025-06-06
Payer: MEDICARE

## 2025-06-09 ENCOUNTER — APPOINTMENT (OUTPATIENT)
Dept: PRIMARY CARE | Facility: CLINIC | Age: OVER 89
End: 2025-06-09
Payer: MEDICARE

## 2025-08-18 DIAGNOSIS — E78.5 DYSLIPIDEMIA: ICD-10-CM

## 2025-08-19 RX ORDER — SIMVASTATIN 10 MG/1
10 TABLET, FILM COATED ORAL NIGHTLY
Qty: 90 TABLET | Refills: 0 | Status: SHIPPED | OUTPATIENT
Start: 2025-08-19

## 2025-10-10 ENCOUNTER — APPOINTMENT (OUTPATIENT)
Dept: PRIMARY CARE | Facility: CLINIC | Age: OVER 89
End: 2025-10-10
Payer: MEDICARE

## 2025-10-31 ENCOUNTER — APPOINTMENT (OUTPATIENT)
Dept: PRIMARY CARE | Facility: CLINIC | Age: OVER 89
End: 2025-10-31
Payer: MEDICARE